# Patient Record
Sex: FEMALE | Race: WHITE | NOT HISPANIC OR LATINO | Employment: FULL TIME | URBAN - NONMETROPOLITAN AREA
[De-identification: names, ages, dates, MRNs, and addresses within clinical notes are randomized per-mention and may not be internally consistent; named-entity substitution may affect disease eponyms.]

---

## 2017-02-17 ENCOUNTER — HOSPITAL ENCOUNTER (OUTPATIENT)
Dept: GENERAL RADIOLOGY | Facility: HOSPITAL | Age: 37
Discharge: HOME | End: 2017-02-17
Admitting: FAMILY MEDICINE

## 2017-02-17 ENCOUNTER — TRANSCRIBE ORDERS (OUTPATIENT)
Dept: ADMINISTRATIVE | Facility: HOSPITAL | Age: 37
End: 2017-02-17

## 2017-02-17 DIAGNOSIS — M54.2 CERVICALGIA: ICD-10-CM

## 2017-02-17 DIAGNOSIS — M54.2 CERVICALGIA: Primary | ICD-10-CM

## 2017-02-17 PROCEDURE — 72050 X-RAY EXAM NECK SPINE 4/5VWS: CPT

## 2017-02-27 ENCOUNTER — APPOINTMENT (OUTPATIENT)
Dept: NUCLEAR MEDICINE | Facility: HOSPITAL | Age: 37
End: 2017-02-27

## 2017-02-27 ENCOUNTER — HOSPITAL ENCOUNTER (OUTPATIENT)
Dept: NUCLEAR MEDICINE | Facility: HOSPITAL | Age: 37
End: 2017-02-27

## 2017-02-28 ENCOUNTER — APPOINTMENT (OUTPATIENT)
Dept: NUCLEAR MEDICINE | Facility: HOSPITAL | Age: 37
End: 2017-02-28

## 2017-03-15 ENCOUNTER — TRANSCRIBE ORDERS (OUTPATIENT)
Dept: ADMINISTRATIVE | Facility: HOSPITAL | Age: 37
End: 2017-03-15

## 2017-03-15 DIAGNOSIS — E05.90 HYPERTHYROIDISM: Primary | ICD-10-CM

## 2017-03-30 ENCOUNTER — HOSPITAL ENCOUNTER (OUTPATIENT)
Dept: NUCLEAR MEDICINE | Facility: HOSPITAL | Age: 37
Discharge: HOME | End: 2017-03-30

## 2017-03-30 DIAGNOSIS — E05.90 HYPERTHYROIDISM: ICD-10-CM

## 2017-03-30 PROCEDURE — A9516 IODINE I-123 SOD IODIDE MIC: HCPCS | Performed by: FAMILY MEDICINE

## 2017-03-30 PROCEDURE — 0 SODIUM IODIDE 3.7 MBQ CAPSULE: Performed by: FAMILY MEDICINE

## 2017-03-30 RX ORDER — SODIUM IODIDE I 123 100 UCI/1
1 CAPSULE, GELATIN COATED ORAL
Status: COMPLETED | OUTPATIENT
Start: 2017-03-30 | End: 2017-03-30

## 2017-03-30 RX ADMIN — Medication 1 CAPSULE: at 09:15

## 2017-03-31 ENCOUNTER — HOSPITAL ENCOUNTER (OUTPATIENT)
Dept: NUCLEAR MEDICINE | Facility: HOSPITAL | Age: 37
Discharge: HOME | End: 2017-03-31

## 2017-08-18 ENCOUNTER — TRANSCRIBE ORDERS (OUTPATIENT)
Dept: PHYSICAL THERAPY | Facility: HOSPITAL | Age: 37
End: 2017-08-18

## 2017-08-18 DIAGNOSIS — M54.2 CERVICALGIA: Primary | ICD-10-CM

## 2017-08-25 ENCOUNTER — HOSPITAL ENCOUNTER (OUTPATIENT)
Dept: PHYSICAL THERAPY | Facility: HOSPITAL | Age: 37
Setting detail: THERAPIES SERIES
Discharge: HOME | End: 2017-08-25

## 2017-08-25 DIAGNOSIS — M54.2 NECK PAIN: Primary | ICD-10-CM

## 2017-08-25 PROCEDURE — 97161 PT EVAL LOW COMPLEX 20 MIN: CPT
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
## 2017-09-01 ENCOUNTER — HOSPITAL ENCOUNTER (OUTPATIENT)
Dept: PHYSICAL THERAPY | Facility: HOSPITAL | Age: 37
Setting detail: THERAPIES SERIES
Discharge: HOME | End: 2017-09-01

## 2017-09-01 DIAGNOSIS — M54.2 NECK PAIN: Primary | ICD-10-CM

## 2017-09-01 PROCEDURE — 97140 MANUAL THERAPY 1/> REGIONS: CPT | Performed by: PHYSICAL THERAPIST

## 2017-09-05 NOTE — THERAPY TREATMENT NOTE
Outpatient Physical Therapy Ortho Treatment Note   [place]     Patient Name: Aaliyah Obando  : 1980  MRN: [id number]  Today's Date: 2017      Visit Date: 2017    Visit Dx:    ICD-10-CM ICD-9-CM   1. Neck pain M54.2 723.1       Patient Active Problem List   Diagnosis   • Upper respiratory infection        Past Medical History:   Diagnosis Date   • Asthma    • Gallbladder abscess    • History of bronchitis         Past Surgical History:   Procedure Laterality Date   • CHOLECYSTECTOMY                                                                  Time Calculation:   Start Time: 45  Stop Time: 1030  Time Calculation (min): 45 min  Total Timed Code Minutes- PT: 45 minute(s)                  Jayden Chandler, PT  2017

## 2017-09-08 ENCOUNTER — HOSPITAL ENCOUNTER (OUTPATIENT)
Dept: PHYSICAL THERAPY | Facility: HOSPITAL | Age: 37
Setting detail: THERAPIES SERIES
Discharge: HOME | End: 2017-09-08

## 2017-09-08 DIAGNOSIS — M54.2 NECK PAIN: Primary | ICD-10-CM

## 2017-09-08 PROCEDURE — 97110 THERAPEUTIC EXERCISES: CPT

## 2017-09-08 PROCEDURE — 97140 MANUAL THERAPY 1/> REGIONS: CPT

## 2017-09-08 NOTE — THERAPY TREATMENT NOTE
Outpatient Physical Therapy Ortho Treatment Note  [facility]     Patient Name: Aaliyah Obando  : 1980  MRN: [id number]  Today's Date: 2017      Visit Date: 2017    Visit Dx:    ICD-10-CM ICD-9-CM   1. Neck pain M54.2 723.1       Patient Active Problem List   Diagnosis   • Upper respiratory infection        Past Medical History:   Diagnosis Date   • Asthma    • Gallbladder abscess    • History of bronchitis         Past Surgical History:   Procedure Laterality Date   • CHOLECYSTECTOMY                               PT Assessment/Plan       17 1127       PT Assessment    Assessment Comments Her thoracic spine and cervical spine continue to demonstrate mobility impairments thus placing continued strain across her lower cervical spine for mobility and leading to poor compensatory movement patterns. We focused on ST retrictions and joint mobility with carry over to AROM and muscle re-education of the cervical spine.   -TC     PT Plan    PT Plan Comments Continue to addresess her ST and mobility restrictions with progression towards postural ROM and stability exercises for re-education.   -TC       User Key  (r) = Recorded By, (t) = Taken By, (c) = Cosigned By    Initials Name Provider Type    TC Yusra Gibson, PT DPT Physical Therapist                    Exercises       17 0963          Subjective Comments    Subjective Comments Pt reported that her neck is a bit flared today because she has been travelling a lot. She still reports HAs. Her R arm has gone numb several times in the past week.    -TC      Subjective Pain    Able to rate subjective pain? yes  -TC      Pre-Treatment Pain Level 4  -TC      Subjective Pain Comment 4/10 HA and neck pain, and R arm feels weird   -TC      Exercise 1    Exercise Name 1 BARB thoracic ext ROM   -TC      Cueing 1 Verbal;Tactile;Demo  -TC      Sets 1 2  -TC      Reps 1 15  -TC      Exercise 2    Exercise Name 2 chin tucks   -TC      Cueing 2  Verbal;Tactile;Demo  -TC      Sets 2 3  -TC      Reps 2 10  -TC      Exercise 3    Exercise Name 3 chin tuck with rotation   -TC      Cueing 3 Verbal;Tactile;Demo  -TC      Sets 3 3  -TC      Reps 3 10  -TC      Exercise 4    Exercise Name 4 educated on thoracic towel roll stretch/pect stretch supine vs thoracic wall stretch (placed too much of a strain on her neck and R shoulder)    -TC      Time (Minutes) 4 2  -TC      Exercise 5    Exercise Name 5 reivewed and practiced osture in sitting and standing  -TC      Time (Minutes) 5 4  -TC        User Key  (r) = Recorded By, (t) = Taken By, (c) = Cosigned By    Initials Name Provider Type    TC Yusra Gibson, PT DPT Physical Therapist                        Manual Rx (last 36 hours)      Manual Treatments       09/08/17 0938          Manual Rx 1    Manual Rx 1 Location prone thoracic  -TC      Manual Rx 1 Type foam roll ext  -TC      Manual Rx 1 Grade min to mod OP  -TC      Manual Rx 1 Duration 10  -TC      Manual Rx 2    Manual Rx 2 Location prone CT  -TC      Manual Rx 2 Type ext mob  -TC      Manual Rx 2 Grade sustained 3  -TC      Manual Rx 2 Duration 7  -TC      Manual Rx 3    Manual Rx 3 Location STM sara UT/LS  -TC      Manual Rx 3 Duration 6  -TC      Manual Rx 4    Manual Rx 4 Location prone sara C1   -TC      Manual Rx 4 Type PA mob  -TC      Manual Rx 4 Grade 2 repetitive sara  -TC      Manual Rx 4 Duration 5  -TC        User Key  (r) = Recorded By, (t) = Taken By, (c) = Cosigned By    Initials Name Provider Type    TC Yusra Gibson, PT DPT Physical Therapist                PT OP Goals       09/08/17 0938       PT Short Term Goals    STG 1 Patient will be able to demonstrate offloading technique for relief of (R) sided neck and shoulder pain.  -TC     STG 1 Progress New  -TC     Long Term Goals    LTG 1 Patient will demonstrate full and painfree (R) UE elevation without compensation by discharge.  -TC     LTG 1 Progress New  -TC     LTG 2 Patient  will maintain neutral neck posture for >10 min without verbal cueing or an increase in her symptoms by discharge.  -TC     LTG 2 Progress Ongoing  -TC     LTG 2 Progress Comments she still requires cues to maintain midline cervicothoracic orientation  -TC     LTG 3 Patient will demonstrate a 4/5 MMT on bilateral mid and low trapezius muscles by discharge.  -TC     LTG 3 Progress New  -TC     LTG 4 Patient will be able to lift a weighted box from a counter height and transfer it to another counter with proper body mechanics and neck symptoms <3/10 on the VAS scale by discharge.  -TC     LTG 4 Progress New  -TC     LTG 5 Patient will demonstrate normal upper and mid thoracic accessory motion mobility into extension by discharge.  -TC     LTG 5 Progress New  -TC     LTG 6 Patient will be independent with a HEP by discharge.  -TC     LTG 6 Progress Ongoing  -TC     LTG 6 Progress Comments Pt reports that she is performing her HEP daily   -TC     Time Calculation    PT Goal Re-Cert Due Date 09/24/17  -TC       User Key  (r) = Recorded By, (t) = Taken By, (c) = Cosigned By    Initials Name Provider Type    STEPHEN Gibson, PT DPT Physical Therapist                Therapy Education       09/08/17 1126          Therapy Education    Education Details took out standing prayer stretch because she was placing increased strain across cervical spine and R shoulder; given towel roll pect stretch hooklying instead for now, cervical retraction with slight rotation in hooklying   -TC      Given HEP;Symptoms/condition management;Posture/body mechanics  -TC      Program New  -TC      How Provided Verbal;Demonstration  -TC      Provided to Patient  -TC      Level of Understanding Teach back education performed;Verbalized;Demonstrated  -TC        User Key  (r) = Recorded By, (t) = Taken By, (c) = Cosigned By    Initials Name Provider Type    STEPHEN Gibson, PT DPT Physical Therapist                Time Calculation:   Start  Time: 0938  Stop Time: 1025  Time Calculation (min): 47 min  Total Timed Code Minutes- PT: 47 minute(s)    Therapy Charges for Today     Code Description Service Date Service Provider Modifiers Qty    08909287829 HC PT MANUAL THERAPY EA 15 MIN 9/8/2017 Yusra Gibson, PT DPT GP 2    44561867671 HC PT THER PROC EA 15 MIN 9/8/2017 Yusra Gibson, PT DPT GP 1                    Yusra Gibson, PT DPT  9/8/2017

## 2017-09-11 ENCOUNTER — HOSPITAL ENCOUNTER (OUTPATIENT)
Dept: PHYSICAL THERAPY | Facility: HOSPITAL | Age: 37
Setting detail: THERAPIES SERIES
Discharge: HOME | End: 2017-09-11

## 2017-09-11 DIAGNOSIS — M54.2 NECK PAIN: Primary | ICD-10-CM

## 2017-09-11 PROCEDURE — 97140 MANUAL THERAPY 1/> REGIONS: CPT

## 2017-09-11 PROCEDURE — 97110 THERAPEUTIC EXERCISES: CPT

## 2017-09-11 NOTE — THERAPY TREATMENT NOTE
Outpatient Physical Therapy Ortho Treatment Note  [facility]     Patient Name: Aaliyah Obando  : 1980  MRN: [id number]  Today's Date: 2017      Visit Date: 2017    Visit Dx:    ICD-10-CM ICD-9-CM   1. Neck pain M54.2 723.1       Patient Active Problem List   Diagnosis   • Upper respiratory infection        Past Medical History:   Diagnosis Date   • Asthma    • Gallbladder abscess    • History of bronchitis         Past Surgical History:   Procedure Laterality Date   • CHOLECYSTECTOMY                               PT Assessment/Plan       17 1000       PT Assessment    Assessment Comments The patient was having more right UE radicular pain today that was abolished with treatment today.  She had been lifting over the weekend which may have flared her symptoms.  No first rib elevation was noted on the right with assessment today.  Retraction and neutral spine abolished the right UE symptoms.  -RS     PT Plan    PT Plan Comments reassess the effects of the HEP addition; continue to address postural strength/endurance and UE mechanics.  -RS       User Key  (r) = Recorded By, (t) = Taken By, (c) = Cosigned By    Initials Name Provider Type    RS Luis Antonio Shultz, PT DPT Physical Therapist                    Exercises       17 0900          Subjective Comments    Subjective Comments Patient did quite a bit of lifting over the weekend and did drive from [facility] this morning.  The pain is going all the way down the right arm today.    -RS      Subjective Pain    Able to rate subjective pain? yes  -RS      Pre-Treatment Pain Level 6  -RS      Subjective Pain Comment right UE radicular pain abolished after treatment  -RS      Exercise 1    Exercise Name 1 supine retraction sustained hold  -RS      Cueing 1 Demo  -RS      Sets 1 3  -RS      Reps 1 5  -RS      Time (Seconds) 1 15  -RS      Additional Comments abolished right UE radicular pain  -RS      Exercise 2    Exercise Name  2 supine white half foam roller extension/pec stretch  -RS      Cueing 2 Verbal  -RS      Time (Minutes) 2 5   -RS      Additional Comments fingertips interlocked and rested on forehead for pec stretch.  -RS      Exercise 3    Exercise Name 3 reviewed HEP with emphasis on neutral posture and retaction for UE symptoms.  -RS        User Key  (r) = Recorded By, (t) = Taken By, (c) = Cosigned By    Initials Name Provider Type    RS Luis Antonio Shultz, PT DPT Physical Therapist                        Manual Rx (last 36 hours)      Manual Treatments       09/11/17 0900          Manual Rx 1    Manual Rx 1 Location prone thoracic  -RS      Manual Rx 1 Type oscillatory  -RS      Manual Rx 1 Grade min OP  -RS      Manual Rx 1 Duration 10  -RS      Manual Rx 2    Manual Rx 2 Location prone CT  -RS      Manual Rx 2 Type ext mob  -RS      Manual Rx 2 Grade sustained 3  -RS      Manual Rx 2 Duration 7  -RS      Manual Rx 3    Manual Rx 3 Location STM right UT/LS  -RS      Manual Rx 3 Duration 6  -RS      Manual Rx 4    Manual Rx 4 Location supine cervical manual traction  -RS      Manual Rx 4 Type sustained  -RS      Manual Rx 4 Grade 2  -RS      Manual Rx 4 Duration 5 x 30 sec  -RS        User Key  (r) = Recorded By, (t) = Taken By, (c) = Cosigned By    Initials Name Provider Type    RS Luis Antonio Shultz, PT DPT Physical Therapist                PT OP Goals       09/11/17 1000       PT Short Term Goals    STG 1 Patient will be able to demonstrate offloading technique for relief of (R) sided neck and shoulder pain.  -RS     STG 1 Progress Ongoing  -RS     STG 1 Progress Comments instructed on unweighing with belt loop with standing; discussed driving mechanics  -RS     Long Term Goals    LTG 1 Patient will demonstrate full and painfree (R) UE elevation without compensation by discharge.  -RS     LTG 1 Progress Ongoing  -RS     LTG 2 Patient will maintain neutral neck posture for >10 min without verbal cueing or an increase  in her symptoms by discharge.  -RS     LTG 2 Progress Ongoing  -RS     LTG 3 Patient will demonstrate a 4/5 MMT on bilateral mid and low trapezius muscles by discharge.  -RS     LTG 3 Progress Ongoing  -RS     LTG 4 Patient will be able to lift a weighted box from a counter height and transfer it to another counter with proper body mechanics and neck symptoms <3/10 on the VAS scale by discharge.  -RS     LTG 4 Progress Ongoing  -RS     LTG 5 Patient will demonstrate normal upper and mid thoracic accessory motion mobility into extension by discharge.  -RS     LTG 5 Progress Ongoing  -RS     LTG 6 Patient will be independent with a HEP by discharge.  -RS     LTG 6 Progress Progressing  -RS     LTG 6 Progress Comments progressed today; compliant  -RS     Time Calculation    PT Goal Re-Cert Due Date 09/24/17  -RS       User Key  (r) = Recorded By, (t) = Taken By, (c) = Cosigned By    Initials Name Provider Type    RS Luis Antonio Shultz, PT DPT Physical Therapist                Therapy Education       09/11/17 1000          Therapy Education    Education Details supine retraction; attempt supine towel roll pec/thoracic stretch; driving mechanics   -RS      Given HEP;Symptoms/condition management  -RS      Program New  -RS      How Provided Verbal;Demonstration  -RS      Provided to Patient  -RS      Level of Understanding Demonstrated;Verbalized  -RS        User Key  (r) = Recorded By, (t) = Taken By, (c) = Cosigned By    Initials Name Provider Type    KENNY Shultz, PT DPT Physical Therapist                Time Calculation:   Start Time: 0943  Stop Time: 1028  Time Calculation (min): 45 min  Total Timed Code Minutes- PT: 45 minute(s)    Therapy Charges for Today     Code Description Service Date Service Provider Modifiers Qty    34756017887 HC PT MANUAL THERAPY EA 15 MIN 9/11/2017 Luis Antonio Shultz, PT DPT GP 2    58735049722 HC PT THER PROC EA 15 MIN 9/11/2017 Luis Antonio Shultz, PT DPT GP 1                     ASHOK Shultz, PT DPT  9/11/2017

## 2017-09-15 ENCOUNTER — HOSPITAL ENCOUNTER (OUTPATIENT)
Dept: PHYSICAL THERAPY | Facility: HOSPITAL | Age: 37
Setting detail: THERAPIES SERIES
Discharge: HOME | End: 2017-09-15

## 2017-09-15 DIAGNOSIS — M54.2 NECK PAIN: Primary | ICD-10-CM

## 2017-09-15 PROCEDURE — 97140 MANUAL THERAPY 1/> REGIONS: CPT

## 2017-09-15 PROCEDURE — 97110 THERAPEUTIC EXERCISES: CPT

## 2017-09-15 NOTE — THERAPY TREATMENT NOTE
Outpatient Physical Therapy Ortho Treatment Note  [facility]     Patient Name: Aaliyah Obando  : 1980  MRN: [id number]  Today's Date: 9/15/2017      Visit Date: 09/15/2017    Visit Dx:    ICD-10-CM ICD-9-CM   1. Neck pain M54.2 723.1       Patient Active Problem List   Diagnosis   • Upper respiratory infection        Past Medical History:   Diagnosis Date   • Asthma    • Gallbladder abscess    • History of bronchitis         Past Surgical History:   Procedure Laterality Date   • CHOLECYSTECTOMY               PT Ortho       09/15/17 8158    Subjective Comments    Subjective Comments Pt stated that she went swimming yesterday and is very sore and aggravated today. Her neck pain is worse too.   -TC      User Key  (r) = Recorded By, (t) = Taken By, (c) = Cosigned By    Initials Name Provider Type    STEPHEN Gibson, PT DPT Physical Therapist                            PT Assessment/Plan       09/15/17 1864       PT Assessment    Assessment Comments Her pain was abolished post treatment in regards to her R UE radicular pain and HA/neck pain. Her thoracic hyperkyphosis is improving along with her postural alignment,although she still requires cues for erect posture. Her cervicothoracic guarding and mobility continue to be impairments exacerbating her symptoms.   -TC     PT Plan    PT Plan Comments Continue to address her cervicothoracic guarding and mobility and progress her postural stability and strength. Reassess radicular symptoms.   -TC       User Key  (r) = Recorded By, (t) = Taken By, (c) = Cosigned By    Initials Name Provider Type    STEPHEN Gibson, PT DPT Physical Therapist                    Exercises       09/15/17 5816          Subjective Comments    Subjective Comments Pt stated that she went swimming yesterday and is very sore and aggravated today. Her neck pain is worse too.   -TC      Subjective Pain    Able to rate subjective pain? yes  -TC      Pre-Treatment Pain Level 5  -TC       Subjective Pain Comment R neck and RUE radicular pain to her R elbow; current HA  -TC      Exercise 1    Exercise Name 1 hooklying 1/2 foam pect minor stretch   -TC      Cueing 1 Verbal;Tactile;Demo  -TC      Sets 1 2  -TC      Time (Seconds) 1 15  -TC      Exercise 2    Exercise Name 2 hooklying 1/2 foam scapular Ws   -TC      Cueing 2 Verbal;Tactile;Demo  -TC      Sets 2 3  -TC      Reps 2 10  -TC      Exercise 3    Exercise Name 3 hooklying 1/2 foam roller, chin tucks   -TC      Cueing 3 Verbal;Tactile;Demo  -TC      Sets 3 3  -TC      Reps 3 10  -TC      Exercise 4    Exercise Name 4 Practiced mechanics of gait.   -TC      Cueing 4 Verbal;Tactile;Demo  -TC      Time (Minutes) 4 5  -TC        User Key  (r) = Recorded By, (t) = Taken By, (c) = Cosigned By    Initials Name Provider Type    TC Yusra Gibson, PT DPT Physical Therapist                        Manual Rx (last 36 hours)      Manual Treatments       09/15/17 0853          Manual Rx 1    Manual Rx 1 Location prone thoracic  -TC      Manual Rx 1 Type oscillatory  -TC      Manual Rx 1 Grade min OP  -TC      Manual Rx 1 Duration 10  -TC      Manual Rx 2    Manual Rx 2 Location prone CT  -TC      Manual Rx 2 Type ext mob  -TC      Manual Rx 2 Grade sustained 3  -TC      Manual Rx 2 Duration 7  -TC      Manual Rx 3    Manual Rx 3 Location STM right UT/LS  -TC      Manual Rx 3 Duration 6  -TC      Manual Rx 4    Manual Rx 4 Location supine cervical manual traction  -TC      Manual Rx 4 Type sustained  -TC      Manual Rx 4 Grade 2  -TC      Manual Rx 4 Duration 5  -TC      Manual Rx 5    Manual Rx 5 Location suboccipital release   -TC      Manual Rx 5 Duration 5  -TC        User Key  (r) = Recorded By, (t) = Taken By, (c) = Cosigned By    Initials Name Provider Type    TC Yusra Gibson, PT DPT Physical Therapist                PT OP Goals       09/15/17 0853       PT Short Term Goals    STG 1 Patient will be able to demonstrate offloading technique  for relief of (R) sided neck and shoulder pain.  -TC     STG 1 Progress Ongoing  -TC     Long Term Goals    LTG 1 Patient will demonstrate full and painfree (R) UE elevation without compensation by discharge.  -TC     LTG 1 Progress Ongoing  -TC     LTG 2 Patient will maintain neutral neck posture for >10 min without verbal cueing or an increase in her symptoms by discharge.  -TC     LTG 2 Progress Ongoing  -TC     LTG 3 Patient will demonstrate a 4/5 MMT on bilateral mid and low trapezius muscles by discharge.  -TC     LTG 3 Progress Ongoing  -TC     LTG 4 Patient will be able to lift a weighted box from a counter height and transfer it to another counter with proper body mechanics and neck symptoms <3/10 on the VAS scale by discharge.  -TC     LTG 4 Progress Ongoing  -TC     LTG 5 Patient will demonstrate normal upper and mid thoracic accessory motion mobility into extension by discharge.  -TC     LTG 5 Progress Ongoing  -TC     LTG 5 Progress Comments worked on this today and it is improving each session, improved thoracic hyperkyphosis   -TC     LTG 6 Patient will be independent with a HEP by discharge.  -TC     LTG 6 Progress Progressing  -TC     LTG 6 Progress Comments reports daily compliance  -TC     Time Calculation    PT Goal Re-Cert Due Date 09/24/17  -TC       User Key  (r) = Recorded By, (t) = Taken By, (c) = Cosigned By    Initials Name Provider Type    STEPHEN Gibson, PT DPT Physical Therapist                Therapy Education       09/15/17 0931          Therapy Education    Given HEP;Symptoms/condition management;Posture/body mechanics  -TC      Program New  -TC      How Provided Verbal;Demonstration  -TC      Provided to Patient  -TC      Level of Understanding Teach back education performed;Verbalized;Demonstrated  -TC        User Key  (r) = Recorded By, (t) = Taken By, (c) = Cosigned By    Initials Name Provider Type    STEPHEN Gibson, PT DPT Physical Therapist                Time  Calculation:   Start Time: 0853  Stop Time: 0936  Time Calculation (min): 43 min  Total Timed Code Minutes- PT: 43 minute(s)    Therapy Charges for Today     Code Description Service Date Service Provider Modifiers Qty    76285204700 HC PT MANUAL THERAPY EA 15 MIN 9/15/2017 Yusra Gibson, PT DPT GP 2    55267381769 HC PT THER PROC EA 15 MIN 9/15/2017 Yusra Gibson, PT DPT GP 1                    Yusra Gibson, PT DPT  9/15/2017

## 2017-09-18 ENCOUNTER — HOSPITAL ENCOUNTER (OUTPATIENT)
Dept: PHYSICAL THERAPY | Facility: HOSPITAL | Age: 37
Setting detail: THERAPIES SERIES
Discharge: HOME | End: 2017-09-18

## 2017-09-18 DIAGNOSIS — M54.2 NECK PAIN: Primary | ICD-10-CM

## 2017-09-18 PROCEDURE — 97110 THERAPEUTIC EXERCISES: CPT

## 2017-09-18 PROCEDURE — 97140 MANUAL THERAPY 1/> REGIONS: CPT

## 2017-09-18 NOTE — THERAPY TREATMENT NOTE
Outpatient Physical Therapy Ortho Treatment Note   [place]     Patient Name: Aaliyah Obando  : 1980  MRN: [id number]  Today's Date: 2017      Visit Date: 2017    Visit Dx:    ICD-10-CM ICD-9-CM   1. Neck pain M54.2 723.1       Patient Active Problem List   Diagnosis   • Upper respiratory infection        Past Medical History:   Diagnosis Date   • Asthma    • Gallbladder abscess    • History of bronchitis         Past Surgical History:   Procedure Laterality Date   • CHOLECYSTECTOMY                               PT Assessment/Plan       17 1000       PT Assessment    Assessment Comments Patient does demonstrate a strong CW thoracic (right rotation) that is placing more stress on the right CT junction.  She was able to self correct which provided great relief.  We will continue with a more active approach for postural correction.  -RS     PT Plan    PT Plan Comments assess the effects of the HEP; continue to emphasize thoracic neutral and right cervical unloading  -RS       User Key  (r) = Recorded By, (t) = Taken By, (c) = Cosigned By    Initials Name Provider Type    RS Luis Antonio Shultz, PT DPT Physical Therapist                    Exercises       17 0800          Subjective Comments    Subjective Comments Patient is doing better today.  She is not having any arm pain today, jjust in the neck and shoulder  -RS      Subjective Pain    Able to rate subjective pain? yes  -RS      Pre-Treatment Pain Level 3  -RS      Post-Treatment Pain Level 0  -RS      Exercise 1    Exercise Name 1 prone mid/low trap level 1 (fingertips on the back of the head)   pillow under abdomen  -RS      Cueing 1 Verbal  -RS      Sets 1 2  -RS      Reps 1 20  -RS      Additional Comments (L) side only  -RS      Exercise 2    Exercise Name 2 prone Y   pillow under abdomen  -RS      Cueing 2 Verbal  -RS      Sets 2 2  -RS      Reps 2 20  -RS      Additional Comments (L) side only  -RS      Exercise 3     Exercise Name 3 (L) resisted thoracic open book rotation  -RS      Cueing 3 Demo  -RS      Sets 3 4  -RS      Reps 3 20  -RS      Additional Comments red theraband resistance  -RS      Exercise 4    Exercise Name 4 thoracic extension against the wall with CCW active thoracic rotation to correct; added to HEP  -RS      Cueing 4 Demo  -RS      Time (Minutes) 4 5  -RS      Additional Comments multiple times per day  -RS        User Key  (r) = Recorded By, (t) = Taken By, (c) = Cosigned By    Initials Name Provider Type    RS Luis Antonio Shultz, PT DPT Physical Therapist                        Manual Rx (last 36 hours)      Manual Treatments       09/18/17 0900          Manual Rx 1    Manual Rx 1 Location prone thoracic  -RS      Manual Rx 1 Type oscillatory  -RS      Manual Rx 1 Grade min OP  -RS      Manual Rx 1 Duration 10  -RS      Manual Rx 2    Manual Rx 2 Location prone CT right side (T1-T3)  -RS      Manual Rx 2 Type ext mob  -RS      Manual Rx 2 Grade oscillatory 2/3  -RS      Manual Rx 2 Duration 5  -RS        User Key  (r) = Recorded By, (t) = Taken By, (c) = Cosigned By    Initials Name Provider Type    RS Luis Antonio Shultz, PT DPT Physical Therapist                PT OP Goals       09/18/17 0800       PT Short Term Goals    STG 1 Patient will be able to demonstrate offloading technique for relief of (R) sided neck and shoulder pain.  -RS     STG 1 Progress Partially Met  -RS     STG 1 Progress Comments able to self correct thoracic kyphosis and CW rotation  -RS     Long Term Goals    LTG 1 Patient will demonstrate full and painfree (R) UE elevation without compensation by discharge.  -RS     LTG 1 Progress Ongoing  -RS     LTG 1 Progress Comments limited scapular upward rotation due to kyphosis  -RS     LTG 2 Patient will maintain neutral neck posture for >10 min without verbal cueing or an increase in her symptoms by discharge.  -RS     LTG 2 Progress Ongoing  -RS     LTG 3 Patient will demonstrate  a 4/5 MMT on bilateral mid and low trapezius muscles by discharge.  -RS     LTG 3 Progress Ongoing  -RS     LTG 4 Patient will be able to lift a weighted box from a counter height and transfer it to another counter with proper body mechanics and neck symptoms <3/10 on the VAS scale by discharge.  -RS     LTG 4 Progress Ongoing  -RS     LTG 5 Patient will demonstrate normal upper and mid thoracic accessory motion mobility into extension by discharge.  -RS     LTG 5 Progress Ongoing  -RS     LTG 6 Patient will be independent with a HEP by discharge.  -RS     LTG 6 Progress Progressing  -RS     Time Calculation    PT Goal Re-Cert Due Date 09/24/17  -RS       User Key  (r) = Recorded By, (t) = Taken By, (c) = Cosigned By    Initials Name Provider Type    KENNY Shultz, PT DPT Physical Therapist                Therapy Education       09/18/17 1000          Therapy Education    Education Details CCW thoracic rotation with extension against the wall  -RS      Given HEP  -RS      Program New  -RS      How Provided Demonstration  -RS      Provided to Patient  -RS      Level of Understanding Demonstrated  -RS        User Key  (r) = Recorded By, (t) = Taken By, (c) = Cosigned By    Initials Name Provider Type    KENNY Shultz, PT DPT Physical Therapist                Time Calculation:   Start Time: 0846  Stop Time: 0935  Time Calculation (min): 49 min  Total Timed Code Minutes- PT: 45 minute(s)    Therapy Charges for Today     Code Description Service Date Service Provider Modifiers Qty    78895973731  PT THER PROC EA 15 MIN 9/18/2017 Luis Antonio Shultz, PT DPT GP 2    [id number] HC PT MANUAL THERAPY EA 15 MIN 9/18/2017 Luis Antonio Shultz, PT DPT GP 1                    ASHOK Shultz, PT DPT  9/18/2017

## 2017-09-22 ENCOUNTER — APPOINTMENT (OUTPATIENT)
Dept: PHYSICAL THERAPY | Facility: HOSPITAL | Age: 37
End: 2017-09-22

## 2017-09-25 ENCOUNTER — HOSPITAL ENCOUNTER (OUTPATIENT)
Dept: PHYSICAL THERAPY | Facility: HOSPITAL | Age: 37
Setting detail: THERAPIES SERIES
Discharge: HOME | End: 2017-09-25

## 2017-09-25 DIAGNOSIS — M54.2 NECK PAIN: Primary | ICD-10-CM

## 2017-09-25 PROCEDURE — 97110 THERAPEUTIC EXERCISES: CPT

## 2017-09-25 PROCEDURE — 97140 MANUAL THERAPY 1/> REGIONS: CPT

## 2017-09-25 NOTE — THERAPY PROGRESS REPORT/RE-CERT
Outpatient Physical Therapy Ortho Progress Note  [facility]     Patient Name: Aaliyah Obando  : 1980  MRN: [id number]  Today's Date: 2017      Visit Date: 2017    Visit Dx:    ICD-10-CM ICD-9-CM   1. Neck pain M54.2 723.1       Patient Active Problem List   Diagnosis   • Upper respiratory infection        Past Medical History:   Diagnosis Date   • Asthma    • Gallbladder abscess    • History of bronchitis         Past Surgical History:   Procedure Laterality Date   • CHOLECYSTECTOMY                               PT Assessment/Plan       17 0848       PT Assessment    Functional Limitations Limitations in functional capacity and performance;Performance in work activities;Limitation in home management  -TB     Impairments Pain;Impaired muscle endurance;Impaired muscle length;Joint mobility;Poor body mechanics;Posture;Range of motion  -TB     Assessment Comments Patient has most of her pain during and after working.  She is working  on her posture as well as trying to use good body mechanics at work.  She still needs cues for her posture today as well as proper body mechnics.  She is not wearing a good supportive bra today, but she is at work.  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  I am hoping her pain levels decrease when she is able to work her normal schedule.   -AL     Rehab Potential Good  -TB     Patient/caregiver participated in establishment of treatment plan and goals Yes  -TB     Patient would benefit from skilled therapy intervention Yes  -TB     PT Plan    PT Frequency 2x/week  -TB     Predicted Duration of Therapy Intervention (days/wks) 4 weeks  -TB     Planned CPT's? PT THER PROC EA 15 MIN: 60356;PT THER ACT EA 15 MIN: 13619;PT MANUAL THERAPY EA 15 MIN: 25174;PT NEUROMUSC RE-EDUCATION EA 15 MIN: 66637;PT ELECTRICAL STIM UNATTEND: ;PT ELECTRICAL STIM ATTD EA 15 MIN: 16995;PT ULTRASOUND EA 15 MIN: 55843;PT TRACTION CERVICAL:  94083  -     PT Plan Comments Will continue to progress with her HEP as well as training for good body mechanics.   -AL       User Key  (r) = Recorded By, (t) = Taken By, (c) = Cosigned By    Initials Name Provider Type    TB Jayden Chandler, PT Physical Therapist    ANN MARIE Ibarra PTA Physical Therapy Assistant                    Exercises       09/25/17 0848          Subjective Comments    Subjective Comments She is hurting from her neck to the mid thoracic area.   She worked 17 hour days over the weekend and is really hurting.    -AL      Subjective Pain    Able to rate subjective pain? yes  -AL      Pre-Treatment Pain Level 5  -AL      Post-Treatment Pain Level 4  -AL      Exercise 1    Exercise Name 1 prone mid/low trap level 1 (fingertips on the back of the head)   pillow under abdomen  -AL      Cueing 1 Verbal  -AL      Sets 1 1  -AL      Reps 1 15  -AL      Exercise 2    Exercise Name 2 UE hasic With half foam behind back against the wal   R UE in pain free ranges  -AL      Cueing 2 Verbal  -AL      Sets 2 1  -AL      Reps 2 15  -AL      Exercise 3    Exercise Name 3 Standing with half foam hind back scapular retraction   -AL      Sets 3 1  -AL      Reps 3 15  -AL      Exercise 4    Exercise Name 4 She is to work on her posture while driiving.  -AL      Exercise 5    Exercise Name 5 Instructed on golfers bend for her to use at work.  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  -AL        User Key  (r) = Recorded By, (t) = Taken By, (c) = Cosigned By    Initials Name Provider Type    ANN MARIE Ibarra PTA Physical Therapy Assistant                        Manual Rx (last 36 hours)      Manual Treatments       09/25/17 0848          Manual Rx 2    Manual Rx 2 Location prone CT  -AL      Manual Rx 2 Type ext mob  -AL      Manual Rx 2 Grade sustained 3  -AL      Manual Rx 2 Duration 5  -AL      Manual Rx 3    Manual Rx 3 Location STM B Upper  and mid traps  -AL      Manual Rx 3 Duration 10  -AL        User Key  (r) = Recorded By, (t) = Taken By, (c) = Cosigned By    Initials Name Provider Type    ANN MARIE Ibarra, PTA Physical Therapy Assistant                PT OP Goals       09/25/17 0848       PT Short Term Goals    STG 1 Patient will be able to demonstrate offloading technique for relief of (R) sided neck and shoulder pain.  -AL     STG 1 Progress Partially Met  -AL     STG 1 Progress Comments Working on this while driving  -AL     Long Term Goals    LTG 1 Patient will demonstrate full and painfree (R) UE elevation without compensation by discharge.  -AL     LTG 1 Progress Ongoing  -AL     LTG 1 Progress Comments Still pain with R UE elevation  -AL     LTG 2 Patient will maintain neutral neck posture for >10 min without verbal cueing or an increase in her symptoms by discharge.  -AL     LTG 2 Progress Ongoing  -AL     LTG 2 Progress Comments Needs cues throughout treatment today  -AL     LTG 3 Patient will demonstrate a 4/5 MMT on bilateral mid and low trapezius muscles by discharge.  -AL     LTG 3 Progress Ongoing  -AL     LTG 3 Progress Comments Still exhibits weakness  -AL     LTG 4 Patient will be able to lift a weighted box from a counter height and transfer it to another counter with proper body mechanics and neck symptoms <3/10 on the VAS scale by discharge.  -AL     LTG 4 Progress Ongoing  -AL     LTG 4 Progress Comments Causes increase pain when working  -AL     LTG 5 Patient will demonstrate normal upper and mid thoracic accessory motion mobility into extension by discharge.  -AL     LTG 5 Progress Ongoing  -AL     LTG 5 Progress Comments Improving  -AL     LTG 6 Patient will be independent with a HEP by discharge.  -AL     LTG 6 Progress Progressing  -AL     LTG 6 Progress Comments She is working on her HEP daily  -AL     Time Calculation    PT Goal Re-Cert Due Date 10/25/17  -AL       User Key  (r) = Recorded By, (t) = Taken By, (c) =  Cosigned By    Initials Name Provider Type    ANN MARIE Ibarra PTA Physical Therapy Assistant                Therapy Education       09/25/17 0848          Therapy Education    Education Details Posture and body mechanics  -AL      Given HEP  -AL      Program Reinforced  -AL      How Provided Verbal  -AL      Provided to Patient  -AL      Level of Understanding Verbalized;Demonstrated;Teach back education performed  -AL        User Key  (r) = Recorded By, (t) = Taken By, (c) = Cosigned By    Initials Name Provider Type    ANN MARIE Ibarra PTA Physical Therapy Assistant                Time Calculation:   Start Time: 0848  Stop Time: 0937  Time Calculation (min): 49 min  Total Timed Code Minutes- PT: 49 minute(s)          The plan of care and all goals were reviewed and updated as needed by Jayden Chandler, PT 9/25/2017 5:46 PM          Jayden Chandler, PT  9/25/2017

## 2017-12-04 ENCOUNTER — DOCUMENTATION (OUTPATIENT)
Dept: PHYSICAL THERAPY | Facility: HOSPITAL | Age: 37
End: 2017-12-04

## 2017-12-04 DIAGNOSIS — M54.2 NECK PAIN: Primary | ICD-10-CM

## 2018-04-03 ENCOUNTER — PROCEDURE VISIT (OUTPATIENT)
Dept: OBSTETRICS AND GYNECOLOGY | Facility: CLINIC | Age: 38
End: 2018-04-03

## 2018-04-03 ENCOUNTER — OFFICE VISIT (OUTPATIENT)
Dept: OBSTETRICS AND GYNECOLOGY | Facility: CLINIC | Age: 38
End: 2018-04-03

## 2018-04-03 VITALS
BODY MASS INDEX: 24.29 KG/M2 | WEIGHT: 164 LBS | SYSTOLIC BLOOD PRESSURE: 120 MMHG | HEIGHT: 69 IN | DIASTOLIC BLOOD PRESSURE: 76 MMHG

## 2018-04-03 DIAGNOSIS — N88.9 ABNORMAL APPEARANCE OF CERVIX: ICD-10-CM

## 2018-04-03 DIAGNOSIS — N90.89 VULVAR LESION: ICD-10-CM

## 2018-04-03 DIAGNOSIS — N92.6 IRREGULAR BLEEDING: Primary | ICD-10-CM

## 2018-04-03 DIAGNOSIS — N92.1 MENORRHAGIA WITH IRREGULAR CYCLE: Primary | ICD-10-CM

## 2018-04-03 DIAGNOSIS — N92.1 PROLONGED MENSTRUATION: ICD-10-CM

## 2018-04-03 DIAGNOSIS — N93.9 VAGINAL BLEEDING: ICD-10-CM

## 2018-04-03 LAB
B-HCG UR QL: NEGATIVE
BASOPHILS # BLD AUTO: 0.05 10*3/MM3 (ref 0–0.2)
BASOPHILS NFR BLD AUTO: 0.6 % (ref 0–2)
EOSINOPHIL # BLD AUTO: 0.34 10*3/MM3 (ref 0–0.7)
EOSINOPHIL NFR BLD AUTO: 4 % (ref 0–4)
ERYTHROCYTE [DISTWIDTH] IN BLOOD BY AUTOMATED COUNT: 12.6 % (ref 12–15)
HCT VFR BLD AUTO: 39.7 % (ref 37–47)
HGB BLD-MCNC: 12.8 G/DL (ref 12–16)
IMM GRANULOCYTES # BLD: 0.03 10*3/MM3 (ref 0–0.03)
IMM GRANULOCYTES NFR BLD: 0.4 % (ref 0–5)
INTERNAL NEGATIVE CONTROL: NORMAL
INTERNAL POSITIVE CONTROL: NORMAL
LYMPHOCYTES # BLD AUTO: 2.5 10*3/MM3 (ref 0.72–4.86)
LYMPHOCYTES NFR BLD AUTO: 29.3 % (ref 15–45)
Lab: NORMAL
MCH RBC QN AUTO: 29.7 PG (ref 28–32)
MCHC RBC AUTO-ENTMCNC: 32.2 G/DL (ref 33–36)
MCV RBC AUTO: 92.1 FL (ref 82–98)
MONOCYTES # BLD AUTO: 0.69 10*3/MM3 (ref 0.19–1.3)
MONOCYTES NFR BLD AUTO: 8.1 % (ref 4–12)
NEUTROPHILS # BLD AUTO: 4.91 10*3/MM3 (ref 1.87–8.4)
NEUTROPHILS NFR BLD AUTO: 57.6 % (ref 39–78)
NRBC BLD AUTO-RTO: 0 /100 WBC (ref 0–0)
PLATELET # BLD AUTO: 264 10*3/MM3 (ref 130–400)
RBC # BLD AUTO: 4.31 10*6/MM3 (ref 4.2–5.4)
WBC # BLD AUTO: 8.52 10*3/MM3 (ref 4.8–10.8)

## 2018-04-03 PROCEDURE — 87661 TRICHOMONAS VAGINALIS AMPLIF: CPT | Performed by: NURSE PRACTITIONER

## 2018-04-03 PROCEDURE — 99214 OFFICE O/P EST MOD 30 MIN: CPT | Performed by: NURSE PRACTITIONER

## 2018-04-03 PROCEDURE — 87591 N.GONORRHOEAE DNA AMP PROB: CPT | Performed by: NURSE PRACTITIONER

## 2018-04-03 PROCEDURE — 81025 URINE PREGNANCY TEST: CPT | Performed by: NURSE PRACTITIONER

## 2018-04-03 PROCEDURE — G0123 SCREEN CERV/VAG THIN LAYER: HCPCS | Performed by: NURSE PRACTITIONER

## 2018-04-03 PROCEDURE — 87491 CHLMYD TRACH DNA AMP PROBE: CPT | Performed by: NURSE PRACTITIONER

## 2018-04-03 PROCEDURE — 76830 TRANSVAGINAL US NON-OB: CPT | Performed by: OBSTETRICS & GYNECOLOGY

## 2018-04-03 RX ORDER — MEDROXYPROGESTERONE ACETATE 10 MG/1
10 TABLET ORAL DAILY
Qty: 10 TABLET | Refills: 1 | Status: SHIPPED | OUTPATIENT
Start: 2018-04-03 | End: 2018-04-13

## 2018-04-03 NOTE — PROGRESS NOTES
Aaliyah Obando is a 37 y.o.      Chief Complaint   Patient presents with   • Menstrual Problem     New patient today with c/o irregular period just this month. Patient states that her periods usually only last 2-3 days. Her most recent period has lasted 13 days and is still bleeding. Patient is not on any form of birth control. Patient had US today.  [facility] [place], patient staes it was normal.            HPI -Patient has been on her menses x 13 days, it has gone from light to heavy back to what now is like a normal menstrual flow.  She usually has regular 5 day periods.  She does not use birth control, states her boyfriend is 36 and has not fathered any children.  Her PG test is negative.      The following portions of the patient's history were reviewed and updated as appropriate:vital signs, allergies, current medications, past family history, past medical history, past social history, past surgical history and problem list.    No current outpatient prescriptions on file.    Review of Systems   Constitutional: Negative for activity change, chills and fatigue.   HENT: Negative for congestion, drooling, facial swelling, nosebleeds, sinus pain and sore throat.    Eyes: Negative for photophobia, pain and discharge.   Respiratory: Negative for apnea, choking and stridor.    Cardiovascular: Negative for chest pain and leg swelling.   Gastrointestinal: Negative for abdominal distention, anal bleeding and diarrhea.   Endocrine: Negative for cold intolerance and polydipsia.        Dr. Josiah Chilel is monitoring her thyroid levels   Genitourinary: Positive for menstrual problem and vaginal bleeding. Negative for decreased urine volume, difficulty urinating, enuresis and genital sores.   Musculoskeletal: Positive for arthralgias and back pain.   Skin: Negative for color change and pallor.   Neurological: Negative for dizziness, seizures and light-headedness.  "  Psychiatric/Behavioral: Negative for agitation, confusion and hallucinations.     Breast ROS: negative    Objective      /76   Ht 175.3 cm (69\")   Wt 74.4 kg (164 lb)   LMP 03/22/2018 (Exact Date)   BMI 24.22 kg/m²       Physical Exam   Constitutional: She is oriented to person, place, and time. She appears well-developed and well-nourished.   HENT:   Head: Normocephalic and atraumatic.   Pulmonary/Chest: Effort normal.   Abdominal: Soft.   Genitourinary: Rectal exam shows no mass.       There is tenderness and lesion on the right labia. There is no rash on the right labia. There is no rash, lesion or injury on the left labia. Uterus is not deviated and not enlarged. Right adnexum displays no mass and no tenderness. Left adnexum displays no mass and no tenderness. No erythema, tenderness or bleeding in the vagina. No foreign body in the vagina. No signs of injury around the vagina. No vaginal discharge found.       Genitourinary Comments: There is a vurrucus type lesion on the inner right labia majora that has been present for years    It is tender and bleeds at times.    It is slightly uniformly pigmented and has the appearance of condylomata vs vurrucus keratosis      The uterus is prolapsed 1 degree    The cervix is large, the os is open with a yellowish smooth nodule seen that is about 1 cm in circumference   Neurological: She is alert and oriented to person, place, and time.   Skin: Skin is warm and dry.   Psychiatric: She has a normal mood and affect. Her behavior is normal.   Nursing note and vitals reviewed.       Assessment/Plan     ASSESSMENT/PLAN      Aaliyah was seen today for menstrual problem.    Diagnoses and all orders for this visit:    Menorrhagia with irregular cycle  -     POC Pregnancy, Urine  negative  -     medroxyPROGESTERone (PROVERA) 10 MG tablet; Take 1 tablet by mouth Daily for 10 days.    Prolonged menstruation    Abnormal appearance of cervix  Colposcopy scheduled  -     " Liquid-based Pap Smear, Screening    Vaginal bleeding  -     CBC & Differential    Vulvar lesion  Appears to be likely condylomata but since is tender and bleeds, may need to be biopsied before treatment initiated.  Aaliyah will RTO and see Dr. Ross.      Patient is to take the Provera 10 mgm x 10 days to stop the bleeding.  She knows this will then cause a withdrawal bleed.              Yvette Mueller, APRN  4/3/2018

## 2018-04-06 LAB
GEN CATEG CVX/VAG CYTO-IMP: ABNORMAL
LAB AP CASE REPORT: ABNORMAL
LAB AP GYN ADDITIONAL INFORMATION: ABNORMAL
Lab: ABNORMAL
PATH INTERP SPEC-IMP: ABNORMAL
STAT OF ADQ CVX/VAG CYTO-IMP: ABNORMAL

## 2018-04-10 ENCOUNTER — TELEPHONE (OUTPATIENT)
Dept: OBSTETRICS AND GYNECOLOGY | Facility: CLINIC | Age: 38
End: 2018-04-10

## 2018-04-24 ENCOUNTER — OFFICE VISIT (OUTPATIENT)
Dept: OBSTETRICS AND GYNECOLOGY | Facility: CLINIC | Age: 38
End: 2018-04-24

## 2018-04-24 VITALS
SYSTOLIC BLOOD PRESSURE: 100 MMHG | HEIGHT: 69 IN | WEIGHT: 168 LBS | BODY MASS INDEX: 24.88 KG/M2 | DIASTOLIC BLOOD PRESSURE: 74 MMHG

## 2018-04-24 DIAGNOSIS — R87.613 HGSIL (HIGH GRADE SQUAMOUS INTRAEPITHELIAL LESION) ON PAP SMEAR OF CERVIX: Primary | ICD-10-CM

## 2018-04-24 DIAGNOSIS — A63.0 CONDYLOMA ACUMINATUM OF VULVA: ICD-10-CM

## 2018-04-24 LAB
B-HCG UR QL: NEGATIVE
INTERNAL NEGATIVE CONTROL: NEGATIVE
INTERNAL POSITIVE CONTROL: NEGATIVE
Lab: NORMAL

## 2018-04-24 PROCEDURE — 88305 TISSUE EXAM BY PATHOLOGIST: CPT | Performed by: OBSTETRICS & GYNECOLOGY

## 2018-04-24 PROCEDURE — 57454 BX/CURETT OF CERVIX W/SCOPE: CPT | Performed by: OBSTETRICS & GYNECOLOGY

## 2018-04-24 NOTE — PROGRESS NOTES
"Aaliyah Obando is a 37 y.o. female  here today for colposcopy.  Her most recent Pap smear was read as HGSIL.  She has history of colposcopy for cervical dysplasia but no treatment.    /74 (BP Location: Right arm, Patient Position: Sitting, Cuff Size: Adult)   Ht 175.3 cm (69\")   Wt 76.2 kg (168 lb)   LMP 03/27/2018 (Approximate)   Breastfeeding? No   BMI 24.81 kg/m²      A urine pregnancy test in the office today was negative.    There is a 2cm condyloma on the right perineum.    Colposcopy was performed in the office today.  She was placed in the lithotomy position on the exam table.  A speculum was inserted and the cervix well visualized.  The cervix was cleansed with acetic acid swabs.  Inspection with the colposcope showed the transformation zone on the ectocervix.  It was seen in its entirety.  There were acetowhite lesions noted at 6:00 with faint punctation.  There was some active metaplasia noted.  Colposcopy was satisfactory. The cervix was anesthetized with Hurricaine spray.  A 6:00 biopsy was performed.  The biopsy site was made hemostatic with a silver nitrate stick.  An endocervical curettage was performed.    Colposcopic impression: Moderate dysplasia, vulvar condyloma    We will notify her when the pathology report is available and she will return in 2 weeks to discuss further care.  We have discussed post colposcopy instructions.   "

## 2018-04-24 NOTE — PROGRESS NOTES
Attempted to obtain health maintenance information, patient unable to provide answers for the following items Tdap and Pneumococcal Vaccine.

## 2018-04-26 LAB
CYTO UR: NORMAL
CYTO UR: NORMAL
LAB AP CASE REPORT: NORMAL
LAB AP CASE REPORT: NORMAL
LAB AP CLINICAL INFORMATION: NORMAL
LAB AP CLINICAL INFORMATION: NORMAL
Lab: NORMAL
Lab: NORMAL
PATH REPORT.FINAL DX SPEC: NORMAL
PATH REPORT.FINAL DX SPEC: NORMAL
PATH REPORT.GROSS SPEC: NORMAL
PATH REPORT.GROSS SPEC: NORMAL

## 2018-05-10 ENCOUNTER — OFFICE VISIT (OUTPATIENT)
Dept: OBSTETRICS AND GYNECOLOGY | Facility: CLINIC | Age: 38
End: 2018-05-10

## 2018-05-10 VITALS
SYSTOLIC BLOOD PRESSURE: 98 MMHG | HEIGHT: 69 IN | BODY MASS INDEX: 24.88 KG/M2 | WEIGHT: 168 LBS | DIASTOLIC BLOOD PRESSURE: 60 MMHG

## 2018-05-10 DIAGNOSIS — A63.0 CONDYLOMA ACUMINATUM OF VULVA: ICD-10-CM

## 2018-05-10 DIAGNOSIS — N87.9 CERVICAL DYSPLASIA: Primary | ICD-10-CM

## 2018-05-10 PROCEDURE — 99213 OFFICE O/P EST LOW 20 MIN: CPT | Performed by: OBSTETRICS & GYNECOLOGY

## 2018-05-10 NOTE — PROGRESS NOTES
"Aaliyah Obando is a 37 y.o. female here today for follow-up of cervical dysplasia.  She underwent colposcopy and biopsies returned showing mild dysplasia, and the ECC was positive.  Her previous Pap smear was read as HGSIL.  She also has a large right vulvar condyloma.  She denies abnormal vaginal bleeding or discharge.    Visit Vitals  BP 98/60 (BP Location: Right arm, Patient Position: Sitting)   Ht 175.3 cm (69\")   Wt 76.2 kg (168 lb)   LMP 04/26/2018 (Exact Date)   BMI 24.81 kg/m²     Pleasant female no acute distress  Breathing unlabored  Abdomen nontender    Assessment: Cervical dysplasia, vulvar condyloma    She would like to have the large vulvar condyloma removed, and I think given her Pap smear history performing a LEEP at the same time is reasonable.  Therefore we discussed the surgical procedure of LEEP cervical conization with excision of vulvar condyloma.  We discussed the risk of bleeding and infection and the importance of follow-up.  She is scheduled for surgery May 30.  Preoperative orders have been placed.    "

## 2018-05-14 RX ORDER — SODIUM CHLORIDE 9 MG/ML
100 INJECTION, SOLUTION INTRAVENOUS CONTINUOUS
Status: CANCELLED | OUTPATIENT
Start: 2018-05-14

## 2018-05-14 NOTE — H&P
[facility]  Aaliyah Obando  : 1980  MRN: [id number]  [facility]: [id number]    History and Physical    Subjective   Aaliyah Obando is a 37 y.o. year old  who presents for surgery due to vulvar condyloma and cervical dysplasia.  Her Pap smear was read as HGSIL but her biopsies were CONNER-1.    Past Medical History:   Diagnosis Date   • Asthma    • Gallbladder abscess    • History of bronchitis    • HPV in female      Past Surgical History:   Procedure Laterality Date   • CHOLECYSTECTOMY       Smoking status: Current Every Day Smoker                                                   Packs/day: 0.15      Years: 0.00         Types: Cigarettes     Last attempt to quit: 11/15/2014  Smokeless tobacco: Never Used                        No current outpatient prescriptions on file.    No Known Allergies    Family History   Problem Relation Age of Onset   • Cancer Mother    • Heart disease Mother    • Diabetes Mother    • Stroke Mother    • Obesity Mother    • Cancer Father    • Heart disease Father    • Diabetes Father    • Stroke Father    • Lung disease Father    • Diabetes Sister    • Cancer Maternal Grandmother    • Heart disease Maternal Grandmother    • Diabetes Maternal Grandmother    • Lung disease Maternal Grandmother    • Stroke Maternal Grandmother    • Obesity Maternal Grandmother    • Ovarian cancer Maternal Grandmother    • Cancer Maternal Grandfather    • Heart disease Maternal Grandfather    • Diabetes Maternal Grandfather    • Lung disease Maternal Grandfather    • Stroke Maternal Grandfather    • Obesity Maternal Grandfather    • Cancer Paternal Grandmother    • Heart disease Paternal Grandmother    • Diabetes Paternal Grandmother    • Lung disease Paternal Grandmother    • Stroke Paternal Grandmother    • Obesity Paternal Grandmother    • Cancer Paternal Grandfather    • Heart disease Paternal Grandfather    • Diabetes Paternal Grandfather    • Lung disease Paternal Grandfather    • Stroke  "Paternal Grandfather    • Obesity Paternal Grandfather    • Colon cancer Paternal Grandfather    • Breast cancer Neg Hx    • Uterine cancer Neg Hx    • Melanoma Neg Hx      Review of Systems   Constitutional: Negative for unexpected weight change.   HENT: Negative for trouble swallowing.    Respiratory: Negative for shortness of breath.    Cardiovascular: Negative for chest pain.   Musculoskeletal: Negative for neck stiffness.   Neurological: Negative for seizures.   Hematological: Does not bruise/bleed easily.         Objective   BP 98/60 (BP Location: Right arm, Patient Position: Sitting)   Ht 175.3 cm (69\")   Wt 76.2 kg (168 lb)   LMP 04/26/2018 (Exact Date)   BMI 24.81 kg/m²     Physical Exam   Physical Exam   Constitutional: She is oriented to person, place, and time. She appears well-developed and well-nourished.   HENT:   Head: Normocephalic and atraumatic.   Eyes: No scleral icterus.   Neck: No tracheal deviation present.   Cardiovascular: Normal rate and regular rhythm.    Pulmonary/Chest: Effort normal and breath sounds normal.   Abdominal: Soft. Bowel sounds are normal. She exhibits no distension. There is no tenderness.   Genitourinary: Uterus normal. Rectal exam shows no external hemorrhoid. Pelvic exam was performed with patient supine. There is no lesion on the right labia. There is no lesion on the left labia. Uterus is not enlarged, not fixed and not tender. Cervix exhibits no motion tenderness. Right adnexum displays no mass. Left adnexum displays no mass. No bleeding in the vagina. No vaginal discharge found.   Lymphadenopathy:        Right: No inguinal adenopathy present.        Left: No inguinal adenopathy present.   Neurological: She is alert and oriented to person, place, and time.   Skin: Skin is warm and dry.   Vitals reviewed.      Labs  Lab Results   Component Value Date     04/03/2018    HGB 12.8 04/03/2018    HCT 39.7 04/03/2018    WBC 8.52 04/03/2018        Assessment & " Plan    Aaliyah was seen today for results.    Diagnoses and all orders for this visit:    Cervical dysplasia  -     Case Request    Condyloma acuminatum of vulva  -     Case Request    Risks, benefits and indications for LEEP conization were discussed with the patient at length.  Risks of bleeding and infection were reviewed.  Post operative restrictions were discussed and encouraged.  The importance of post operative pap smear surveillance as directed was explained.  Unknown effects on fertility were discussed.  If pregnancy were to occur following the LEEP, management with ultrasounds to determine cervical length and timely diagnosis of cervical incompetence was explained.    All of the patient's questions were answered to her satisfaction and she agreed to proceeding as indicated.     Bautista Ross MD  5/14/2018  7:28 AM

## 2018-05-16 ENCOUNTER — APPOINTMENT (OUTPATIENT)
Dept: PREADMISSION TESTING | Facility: HOSPITAL | Age: 38
End: 2018-05-16

## 2018-05-16 VITALS
OXYGEN SATURATION: 98 % | HEART RATE: 70 BPM | HEIGHT: 70 IN | WEIGHT: 162.92 LBS | BODY MASS INDEX: 23.32 KG/M2 | DIASTOLIC BLOOD PRESSURE: 64 MMHG | SYSTOLIC BLOOD PRESSURE: 105 MMHG

## 2018-05-16 LAB
ANION GAP SERPL CALCULATED.3IONS-SCNC: 7 MMOL/L (ref 4–13)
BUN BLD-MCNC: 12 MG/DL (ref 5–21)
BUN/CREAT SERPL: 15 (ref 7–25)
CALCIUM SPEC-SCNC: 9.2 MG/DL (ref 8.4–10.4)
CHLORIDE SERPL-SCNC: 107 MMOL/L (ref 98–110)
CO2 SERPL-SCNC: 26 MMOL/L (ref 24–31)
CREAT BLD-MCNC: 0.8 MG/DL (ref 0.5–1.4)
DEPRECATED RDW RBC AUTO: 40.7 FL (ref 40–54)
ERYTHROCYTE [DISTWIDTH] IN BLOOD BY AUTOMATED COUNT: 12.5 % (ref 12–15)
GFR SERPL CREATININE-BSD FRML MDRD: 80 ML/MIN/1.73
GLUCOSE BLD-MCNC: 85 MG/DL (ref 70–100)
HCT VFR BLD AUTO: 41.3 % (ref 37–47)
HGB BLD-MCNC: 13.6 G/DL (ref 12–16)
MCH RBC QN AUTO: 29.3 PG (ref 28–32)
MCHC RBC AUTO-ENTMCNC: 32.9 G/DL (ref 33–36)
MCV RBC AUTO: 89 FL (ref 82–98)
PLATELET # BLD AUTO: 259 10*3/MM3 (ref 130–400)
PMV BLD AUTO: 10.5 FL (ref 6–12)
POTASSIUM BLD-SCNC: 4.1 MMOL/L (ref 3.5–5.3)
RBC # BLD AUTO: 4.64 10*6/MM3 (ref 4.2–5.4)
SODIUM BLD-SCNC: 140 MMOL/L (ref 135–145)
WBC NRBC COR # BLD: 8.81 10*3/MM3 (ref 4.8–10.8)

## 2018-05-16 PROCEDURE — 80048 BASIC METABOLIC PNL TOTAL CA: CPT | Performed by: OBSTETRICS & GYNECOLOGY

## 2018-05-16 PROCEDURE — 85027 COMPLETE CBC AUTOMATED: CPT | Performed by: OBSTETRICS & GYNECOLOGY

## 2018-05-16 PROCEDURE — 36415 COLL VENOUS BLD VENIPUNCTURE: CPT | Performed by: OBSTETRICS & GYNECOLOGY

## 2018-05-16 NOTE — DISCHARGE INSTRUCTIONS
DAY OF SURGERY INSTRUCTIONS    Attending:   Bautista Ross MD  Cervical Conization- Excision Vulvar         DATE OF SURGERY: MAY 30 2018  ARRIVAL TIME: AS DIRECTED BY OFFICE    DAY OF SURGERY TAKE ONLY THESE MEDICATIONS UNLESS OTHERWISE INSTRUCTED BY YOUR PHYSICIAN: NONE              MANAGING PAIN AFTER SURGERY    We know you are probably wondering what your pain will be like after surgery.  Following surgery it is unrealistic to expect you will not have pain.   Pain is how our bodies let us know that something is wrong or cautions us to be careful.  That said, our goal is to make your pain tolerable.    Methods we may use to treat your pain include (oral or IV medications, PCAs, epidurals, nerve blocks, etc.)   While some procedures require IV pain medications for a short time after surgery, transitioning to pain medications by mouth allows for better management of pain.   Your nurse will encourage you to take oral pain medications whenever possible.  IV medications work almost immediately, but only last a short while.  Taking medications by mouth allows for a more constant level of medication in your blood stream for a longer period of time.      Once your pain is out of control it is harder to get back under control.  It is important you are aware when your next dose of pain medication is due.  If you are admitted, your nurse may write the time of your next dose on the white board in your room to help you remember.      We are interested in your pain and encourage you to inform us about aggravating factors during your visit.   Many times a simple repositioning every few hours can make a big difference.    If your physician says it is okay, do not let your pain prevent you from getting out of bed. Be sure to call your nurse for assistance prior to getting up so you do not fall.      Before surgery, please decide your tolerable pain goal.  These faces help describe the pain ratings we use on a 0-10 scale.   Be  prepared to tell us your goal and whether or not you take pain or anxiety medications at home.            BEFORE YOU COME TO THE HOSPITAL  (Pre-op instructions)  • Do not eat, drink, smoke or chew gum after midnight the night before surgery.  This also includes no mints.  • Morning of surgery take only the medicines you have been instructed with a sip of water unless otherwise instructed  by your physician.  • Do not shave, wear makeup or dark nail polish.  • Remove all jewelry including rings.  • Leave anything you consider valuable at home.  • Leave your suitcase in the car until after your surgery.  • Bring the following with you if applicable:  o Picture ID and insurance, Medicare or Medicaid cards  o Co-pay/deductible required by insurance (cash, check, credit card)  o Copy of advance directive, living will or power-of- documents if not brought to PAT  o CPAP or BIPAP mask and tubing  o Relaxation aids (MP3 player, book, magazine)  • On the day of surgery check in at registration located at the main entrance of the hospital.       Outpatient Surgery Guidelines, Adult  Outpatient procedures are those for which the person having the procedure is allowed to go home the same day as the procedure. Various procedures are done on an outpatient basis. You should follow some general guidelines if you will be having an outpatient procedure.  LET YOUR HEALTH CARE PROVIDER KNOW ABOUT:  · Any allergies you have.  · All medicines you are taking, including vitamins, herbs, eye drops, creams, and over-the-counter medicines.  · Previous problems you or members of your family have had with the use of anesthetics.  · Any blood disorders you have.  · Previous surgeries you have had.  · Medical conditions you have.  RISKS AND COMPLICATIONS  Your health care provider will discuss possible risks and complications with you before surgery. Common risks and complications include:    · Problems due to the use of  anesthetics.  · Blood loss and replacement (does not apply to minor surgical procedures).  · Temporary increase in pain due to surgery.  · Uncorrected pain or problems that the surgery was meant to correct.  · Infection.  · New damage.  BEFORE THE PROCEDURE  · Ask your health care provider about changing or stopping your regular medicines. You may need to stop taking certain medicines in the days or weeks before the procedure.  · Stop smoking at least 2 weeks before surgery. This lowers your risk for complications during and after surgery. Ask your health care provider for help with this if needed.  · Eat your usual meals and a light supper the day before surgery. Continue fluid intake. Do not drink alcohol.  · Do not eat or drink after midnight the night before your surgery.   · Arrange for someone to take you home and to stay with you for 24 hours after the procedure. Medicine given for your procedure may affect your ability to drive or to care for yourself.  · Call your health care provider's office if you develop an illness or problem that may prevent you from safely having your procedure.  AFTER THE PROCEDURE  After surgery, you will be taken to a recovery area, where your progress will be monitored. If there are no complications, you will be allowed to go home when you are awake, stable, and taking fluids well. You may have numbness around the surgical site. Healing will take some time. You will have tenderness at the surgical site and may have some swelling and bruising. You may also have some nausea.  HOME CARE INSTRUCTIONS  · Do not drive for 24 hours, or as directed by your health care provider. Do not drive while taking prescription pain medicines.  · Do not drink alcohol for 24 hours.  · Do not make important decisions or sign legal documents for 24 hours.  · You may resume a normal diet and activities as directed.  · Do not lift anything heavier than 10 pounds (4.5 kg) or play contact sports until your  health care provider says it is okay.  · Change your bandages (dressings) as directed.  · Only take over-the-counter or prescription medicines as directed by your health care provider.  · Follow up with your health care provider as directed.  SEEK MEDICAL CARE IF:  · You have increased bleeding (more than a small spot) from the surgical site.  · You have redness, swelling, or increasing pain in the wound.  · You see pus coming from the wound.  · You have a fever.  · You notice a bad smell coming from the wound or dressing.  · You feel lightheaded or faint.  · You develop a rash.  · You have trouble breathing.  · You develop allergies.  MAKE SURE YOU:  · Understand these instructions.  · Will watch your condition.  · Will get help right away if you are not doing well or get worse.     This information is not intended to replace advice given to you by your health care provider. Make sure you discuss any questions you have with your health care provider.     Document Released: 09/12/2002 Document Revised: 05/03/2016 Document Reviewed: 05/22/2014  [employer] Interactive Patient Education ©2016 [employer]       Fall Prevention in Hospitals, Adult  As a hospital patient, your condition and the treatments you receive can increase your risk for falls. Some additional risk factors for falls in a hospital include:  · Being in an unfamiliar environment.  · Being on bed rest.  · Your surgery.  · Taking certain medicines.  · Your tubing requirements, such as intravenous (IV) therapy or catheters.  It is important that you learn how to decrease fall risks while at the hospital. Below are important tips that can help prevent falls.  SAFETY TIPS FOR PREVENTING FALLS  Talk about your risk of falling.  · Ask your health care provider why you are at risk for falling. Is it your medicine, illness, tubing placement, or something else?  · Make a plan with your health care provider to keep you safe from falls.  · Ask your health care  provider or pharmacist about side effects of your medicines. Some medicines can make you dizzy or affect your coordination.  Ask for help.  · Ask for help before getting out of bed. You may need to press your call button.  · Ask for assistance in getting safely to the toilet.  · Ask for a walker or cane to be put at your bedside. Ask that most of the side rails on your bed be placed up before your health care provider leaves the room.  · Ask family or friends to sit with you.  · Ask for things that are out of your reach, such as your glasses, hearing aids, telephone, bedside table, or call button.  Follow these tips to avoid falling:  · Stay lying or seated, rather than standing, while waiting for help.  · Wear rubber-soled slippers or shoes whenever you walk in the hospital.  · Avoid quick, sudden movements.  ¨ Change positions slowly.  ¨ Sit on the side of your bed before standing.  ¨ Stand up slowly and wait before you start to walk.  · Let your health care provider know if there is a spill on the floor.  · Pay careful attention to the medical equipment, electrical cords, and tubes around you.  · When you need help, use your call button by your bed or in the bathroom. Wait for one of your health care providers to help you.  · If you feel dizzy or unsure of your footing, return to bed and wait for assistance.  · Avoid being distracted by the TV, telephone, or another person in your room.  · Do not lean or support yourself on rolling objects, such as IV poles or bedside tables.     This information is not intended to replace advice given to you by your health care provider. Make sure you discuss any questions you have with your health care provider.     Document Released: 12/15/2001 Document Revised: 01/08/2016 Document Reviewed: 08/25/2013  [employer] Interactive Patient Education ©2016 [employer]       Surgical Site Infections FAQs  What is a Surgical Site Infection (SSI)?  A surgical site infection is an  infection that occurs after surgery in the part of the body where the surgery took place. Most patients who have surgery do not develop an infection. However, infections develop in about 1 to 3 out of every 100 patients who have surgery.  Some of the common symptoms of a surgical site infection are:  · Redness and pain around the area where you had surgery  · Drainage of cloudy fluid from your surgical wound  · Fever  Can SSIs be treated?  Yes. Most surgical site infections can be treated with antibiotics. The antibiotic given to you depends on the bacteria (germs) causing the infection. Sometimes patients with SSIs also need another surgery to treat the infection.  What are some of the things that hospitals are doing to prevent SSIs?  To prevent SSIs, doctors, nurses, and other healthcare providers:  · Clean their hands and arms up to their elbows with an antiseptic agent just before the surgery.  · Clean their hands with soap and water or an alcohol-based hand rub before and after caring for each patient.  · May remove some of your hair immediately before your surgery using electric clippers if the hair is in the same area where the procedure will occur. They should not shave you with a razor.  · Wear special hair covers, masks, gowns, and gloves during surgery to keep the surgery area clean.  · Give you antibiotics before your surgery starts. In most cases, you should get antibiotics within 60 minutes before the surgery starts and the antibiotics should be stopped within 24 hours after surgery.  · Clean the skin at the site of your surgery with a special soap that kills germs.  What can I do to help prevent SSIs?  Before your surgery:  · Tell your doctor about other medical problems you may have. Health problems such as allergies, diabetes, and obesity could affect your surgery and your treatment.  · Quit smoking. Patients who smoke get more infections. Talk to your doctor about how you can quit before your  surgery.  · Do not shave near where you will have surgery. Shaving with a razor can irritate your skin and make it easier to develop an infection.  At the time of your surgery:  · Speak up if someone tries to shave you with a razor before surgery. Ask why you need to be shaved and talk with your surgeon if you have any concerns.  · Ask if you will get antibiotics before surgery.  After your surgery:  · Make sure that your healthcare providers clean their hands before examining you, either with soap and water or an alcohol-based hand rub.  · If you do not see your providers clean their hands, please ask them to do so.  · Family and friends who visit you should not touch the surgical wound or dressings.  · Family and friends should clean their hands with soap and water or an alcohol-based hand rub before and after visiting you. If you do not see them clean their hands, ask them to clean their hands.  What do I need to do when I go home from the hospital?  · Before you go home, your doctor or nurse should explain everything you need to know about taking care of your wound. Make sure you understand how to care for your wound before you leave the hospital.  · Always clean your hands before and after caring for your wound.  · Before you go home, make sure you know who to contact if you have questions or problems after you get home.  · If you have any symptoms of an infection, such as redness and pain at the surgery site, drainage, or fever, call your doctor immediately.  If you have additional questions, please ask your doctor or nurse.  Developed and co-sponsored by The Society for Healthcare Epidemiology of [place] (SHEA); Infectious Diseases Society of [place] (IDSA); American Hospital Association; Association for Professionals in Infection Control and Epidemiology (APIC); Centers for Disease Control and Prevention (CDC); and The Joint Commission.     This information is not intended to replace advice given to you by  your health care provider. Make sure you discuss any questions you have with your health care provider.     Document Released: 12/23/2014 Document Revised: 01/08/2016 Document Reviewed: 03/02/2016  [employer] Interactive Patient Education ©2016 [employer]     PATIENT/FAMILY/RESPONSIBLE PARTY VERBALIZES UNDERSTANDING OF ABOVE EDUCATION.  COPY OF PAIN SCALE GIVEN AND REVIEWED WITH VERBALIZED UNDERSTANDING.

## 2018-05-19 ENCOUNTER — RESULTS ENCOUNTER (OUTPATIENT)
Dept: OBSTETRICS AND GYNECOLOGY | Facility: CLINIC | Age: 38
End: 2018-05-19

## 2018-05-19 DIAGNOSIS — A63.0 CONDYLOMA ACUMINATUM OF VULVA: ICD-10-CM

## 2018-05-19 DIAGNOSIS — N87.9 CERVICAL DYSPLASIA: ICD-10-CM

## 2018-05-30 ENCOUNTER — HOSPITAL ENCOUNTER (OUTPATIENT)
Facility: HOSPITAL | Age: 38
Setting detail: HOSPITAL OUTPATIENT SURGERY
Discharge: HOME | End: 2018-05-30
Attending: OBSTETRICS & GYNECOLOGY | Admitting: OBSTETRICS & GYNECOLOGY

## 2018-05-30 ENCOUNTER — ANESTHESIA EVENT (OUTPATIENT)
Dept: PERIOP | Facility: HOSPITAL | Age: 38
End: 2018-05-30

## 2018-05-30 ENCOUNTER — ANESTHESIA (OUTPATIENT)
Dept: PERIOP | Facility: HOSPITAL | Age: 38
End: 2018-05-30

## 2018-05-30 VITALS
HEART RATE: 53 BPM | TEMPERATURE: 97.9 F | DIASTOLIC BLOOD PRESSURE: 61 MMHG | OXYGEN SATURATION: 98 % | SYSTOLIC BLOOD PRESSURE: 104 MMHG | RESPIRATION RATE: 18 BRPM

## 2018-05-30 DIAGNOSIS — A63.0 CONDYLOMA ACUMINATUM OF VULVA: ICD-10-CM

## 2018-05-30 DIAGNOSIS — N87.9 CERVICAL DYSPLASIA: ICD-10-CM

## 2018-05-30 LAB — B-HCG UR QL: NEGATIVE

## 2018-05-30 PROCEDURE — 11422 EXC H-F-NK-SP B9+MARG 1.1-2: CPT | Performed by: OBSTETRICS & GYNECOLOGY

## 2018-05-30 PROCEDURE — 25010000002 DEXAMETHASONE PER 1 MG: Performed by: NURSE ANESTHETIST, CERTIFIED REGISTERED

## 2018-05-30 PROCEDURE — 81025 URINE PREGNANCY TEST: CPT | Performed by: OBSTETRICS & GYNECOLOGY

## 2018-05-30 PROCEDURE — 88305 TISSUE EXAM BY PATHOLOGIST: CPT | Performed by: OBSTETRICS & GYNECOLOGY

## 2018-05-30 PROCEDURE — 25010000002 ONDANSETRON PER 1 MG: Performed by: NURSE ANESTHETIST, CERTIFIED REGISTERED

## 2018-05-30 PROCEDURE — 25010000002 KETOROLAC TROMETHAMINE PER 15 MG: Performed by: NURSE ANESTHETIST, CERTIFIED REGISTERED

## 2018-05-30 PROCEDURE — 57522 CONIZATION OF CERVIX: CPT | Performed by: OBSTETRICS & GYNECOLOGY

## 2018-05-30 PROCEDURE — 88307 TISSUE EXAM BY PATHOLOGIST: CPT | Performed by: OBSTETRICS & GYNECOLOGY

## 2018-05-30 PROCEDURE — 25010000002 PROPOFOL 10 MG/ML EMULSION: Performed by: NURSE ANESTHETIST, CERTIFIED REGISTERED

## 2018-05-30 PROCEDURE — 25010000002 FENTANYL CITRATE (PF) 100 MCG/2ML SOLUTION: Performed by: NURSE ANESTHETIST, CERTIFIED REGISTERED

## 2018-05-30 PROCEDURE — 25010000002 MIDAZOLAM PER 1 MG: Performed by: ANESTHESIOLOGY

## 2018-05-30 RX ORDER — MIDAZOLAM HYDROCHLORIDE 1 MG/ML
1 INJECTION INTRAMUSCULAR; INTRAVENOUS
Status: DISCONTINUED | OUTPATIENT
Start: 2018-05-30 | End: 2018-05-30 | Stop reason: HOSPADM

## 2018-05-30 RX ORDER — OXYCODONE AND ACETAMINOPHEN 7.5; 325 MG/1; MG/1
2 TABLET ORAL ONCE AS NEEDED
Status: DISCONTINUED | OUTPATIENT
Start: 2018-05-30 | End: 2018-05-30 | Stop reason: HOSPADM

## 2018-05-30 RX ORDER — IPRATROPIUM BROMIDE AND ALBUTEROL SULFATE 2.5; .5 MG/3ML; MG/3ML
3 SOLUTION RESPIRATORY (INHALATION) ONCE AS NEEDED
Status: DISCONTINUED | OUTPATIENT
Start: 2018-05-30 | End: 2018-05-30 | Stop reason: HOSPADM

## 2018-05-30 RX ORDER — ONDANSETRON 2 MG/ML
4 INJECTION INTRAMUSCULAR; INTRAVENOUS ONCE AS NEEDED
Status: DISCONTINUED | OUTPATIENT
Start: 2018-05-30 | End: 2018-05-30 | Stop reason: HOSPADM

## 2018-05-30 RX ORDER — SODIUM CHLORIDE 0.9 % (FLUSH) 0.9 %
3 SYRINGE (ML) INJECTION AS NEEDED
Status: DISCONTINUED | OUTPATIENT
Start: 2018-05-30 | End: 2018-05-30 | Stop reason: HOSPADM

## 2018-05-30 RX ORDER — HYDROCODONE BITARTRATE AND ACETAMINOPHEN 5; 325 MG/1; MG/1
1 TABLET ORAL ONCE AS NEEDED
Status: CANCELLED | OUTPATIENT
Start: 2018-05-30 | End: 2018-06-09

## 2018-05-30 RX ORDER — IBUPROFEN 600 MG/1
600 TABLET ORAL ONCE AS NEEDED
Status: DISCONTINUED | OUTPATIENT
Start: 2018-05-30 | End: 2018-05-30 | Stop reason: HOSPADM

## 2018-05-30 RX ORDER — MEPERIDINE HYDROCHLORIDE 50 MG/ML
12.5 INJECTION INTRAMUSCULAR; INTRAVENOUS; SUBCUTANEOUS
Status: DISCONTINUED | OUTPATIENT
Start: 2018-05-30 | End: 2018-05-30 | Stop reason: HOSPADM

## 2018-05-30 RX ORDER — ONDANSETRON 2 MG/ML
INJECTION INTRAMUSCULAR; INTRAVENOUS AS NEEDED
Status: DISCONTINUED | OUTPATIENT
Start: 2018-05-30 | End: 2018-05-30 | Stop reason: SURG

## 2018-05-30 RX ORDER — LIDOCAINE HYDROCHLORIDE 20 MG/ML
INJECTION, SOLUTION INFILTRATION; PERINEURAL AS NEEDED
Status: DISCONTINUED | OUTPATIENT
Start: 2018-05-30 | End: 2018-05-30 | Stop reason: SURG

## 2018-05-30 RX ORDER — HYDROCODONE BITARTRATE AND ACETAMINOPHEN 5; 325 MG/1; MG/1
1 TABLET ORAL EVERY 4 HOURS PRN
Qty: 8 TABLET | Refills: 0 | Status: SHIPPED | OUTPATIENT
Start: 2018-05-30 | End: 2018-06-12

## 2018-05-30 RX ORDER — DEXAMETHASONE SODIUM PHOSPHATE 4 MG/ML
INJECTION, SOLUTION INTRA-ARTICULAR; INTRALESIONAL; INTRAMUSCULAR; INTRAVENOUS; SOFT TISSUE AS NEEDED
Status: DISCONTINUED | OUTPATIENT
Start: 2018-05-30 | End: 2018-05-30 | Stop reason: SURG

## 2018-05-30 RX ORDER — MIDAZOLAM HYDROCHLORIDE 1 MG/ML
2 INJECTION INTRAMUSCULAR; INTRAVENOUS
Status: DISCONTINUED | OUTPATIENT
Start: 2018-05-30 | End: 2018-05-30 | Stop reason: HOSPADM

## 2018-05-30 RX ORDER — PROPOFOL 10 MG/ML
VIAL (ML) INTRAVENOUS AS NEEDED
Status: DISCONTINUED | OUTPATIENT
Start: 2018-05-30 | End: 2018-05-30 | Stop reason: SURG

## 2018-05-30 RX ORDER — LUGOLS 0.4 G/G
LIQUID TOPICAL AS NEEDED
Status: DISCONTINUED | OUTPATIENT
Start: 2018-05-30 | End: 2018-05-30 | Stop reason: HOSPADM

## 2018-05-30 RX ORDER — SODIUM CHLORIDE, SODIUM LACTATE, POTASSIUM CHLORIDE, CALCIUM CHLORIDE 600; 310; 30; 20 MG/100ML; MG/100ML; MG/100ML; MG/100ML
9 INJECTION, SOLUTION INTRAVENOUS CONTINUOUS
Status: DISCONTINUED | OUTPATIENT
Start: 2018-05-30 | End: 2018-05-30 | Stop reason: HOSPADM

## 2018-05-30 RX ORDER — LIDOCAINE HYDROCHLORIDE AND EPINEPHRINE 10; 10 MG/ML; UG/ML
INJECTION, SOLUTION INFILTRATION; PERINEURAL AS NEEDED
Status: DISCONTINUED | OUTPATIENT
Start: 2018-05-30 | End: 2018-05-30 | Stop reason: HOSPADM

## 2018-05-30 RX ORDER — LABETALOL HYDROCHLORIDE 5 MG/ML
5 INJECTION, SOLUTION INTRAVENOUS
Status: DISCONTINUED | OUTPATIENT
Start: 2018-05-30 | End: 2018-05-30 | Stop reason: HOSPADM

## 2018-05-30 RX ORDER — NALOXONE HCL 0.4 MG/ML
0.4 VIAL (ML) INJECTION AS NEEDED
Status: DISCONTINUED | OUTPATIENT
Start: 2018-05-30 | End: 2018-05-30 | Stop reason: HOSPADM

## 2018-05-30 RX ORDER — SODIUM CHLORIDE 0.9 % (FLUSH) 0.9 %
1-10 SYRINGE (ML) INJECTION AS NEEDED
Status: DISCONTINUED | OUTPATIENT
Start: 2018-05-30 | End: 2018-05-30 | Stop reason: HOSPADM

## 2018-05-30 RX ORDER — SODIUM CHLORIDE, SODIUM LACTATE, POTASSIUM CHLORIDE, CALCIUM CHLORIDE 600; 310; 30; 20 MG/100ML; MG/100ML; MG/100ML; MG/100ML
1000 INJECTION, SOLUTION INTRAVENOUS CONTINUOUS
Status: DISCONTINUED | OUTPATIENT
Start: 2018-05-30 | End: 2018-05-30 | Stop reason: HOSPADM

## 2018-05-30 RX ORDER — METOCLOPRAMIDE HYDROCHLORIDE 5 MG/ML
5 INJECTION INTRAMUSCULAR; INTRAVENOUS
Status: DISCONTINUED | OUTPATIENT
Start: 2018-05-30 | End: 2018-05-30 | Stop reason: HOSPADM

## 2018-05-30 RX ORDER — FENTANYL CITRATE 50 UG/ML
25 INJECTION, SOLUTION INTRAMUSCULAR; INTRAVENOUS AS NEEDED
Status: DISCONTINUED | OUTPATIENT
Start: 2018-05-30 | End: 2018-05-30 | Stop reason: HOSPADM

## 2018-05-30 RX ORDER — SODIUM CHLORIDE 9 MG/ML
100 INJECTION, SOLUTION INTRAVENOUS CONTINUOUS
Status: DISCONTINUED | OUTPATIENT
Start: 2018-05-30 | End: 2018-05-30 | Stop reason: HOSPADM

## 2018-05-30 RX ORDER — OXYCODONE AND ACETAMINOPHEN 10; 325 MG/1; MG/1
1 TABLET ORAL ONCE AS NEEDED
Status: DISCONTINUED | OUTPATIENT
Start: 2018-05-30 | End: 2018-05-30 | Stop reason: HOSPADM

## 2018-05-30 RX ORDER — FENTANYL CITRATE 50 UG/ML
INJECTION, SOLUTION INTRAMUSCULAR; INTRAVENOUS AS NEEDED
Status: DISCONTINUED | OUTPATIENT
Start: 2018-05-30 | End: 2018-05-30 | Stop reason: SURG

## 2018-05-30 RX ORDER — KETOROLAC TROMETHAMINE 30 MG/ML
INJECTION, SOLUTION INTRAMUSCULAR; INTRAVENOUS AS NEEDED
Status: DISCONTINUED | OUTPATIENT
Start: 2018-05-30 | End: 2018-05-30 | Stop reason: SURG

## 2018-05-30 RX ORDER — FENTANYL CITRATE 50 UG/ML
25 INJECTION, SOLUTION INTRAMUSCULAR; INTRAVENOUS
Status: DISCONTINUED | OUTPATIENT
Start: 2018-05-30 | End: 2018-05-30 | Stop reason: HOSPADM

## 2018-05-30 RX ORDER — DEXTROSE MONOHYDRATE 25 G/50ML
12.5 INJECTION, SOLUTION INTRAVENOUS AS NEEDED
Status: DISCONTINUED | OUTPATIENT
Start: 2018-05-30 | End: 2018-05-30 | Stop reason: HOSPADM

## 2018-05-30 RX ADMIN — LIDOCAINE HYDROCHLORIDE 100 MG: 20 INJECTION, SOLUTION INFILTRATION; PERINEURAL at 07:15

## 2018-05-30 RX ADMIN — PROPOFOL 100 MG: 10 INJECTION, EMULSION INTRAVENOUS at 07:15

## 2018-05-30 RX ADMIN — ONDANSETRON HYDROCHLORIDE 4 MG: 2 SOLUTION INTRAMUSCULAR; INTRAVENOUS at 07:23

## 2018-05-30 RX ADMIN — FENTANYL CITRATE 100 MCG: 50 INJECTION, SOLUTION INTRAMUSCULAR; INTRAVENOUS at 07:12

## 2018-05-30 RX ADMIN — SODIUM CHLORIDE, POTASSIUM CHLORIDE, SODIUM LACTATE AND CALCIUM CHLORIDE 1000 ML: 600; 310; 30; 20 INJECTION, SOLUTION INTRAVENOUS at 06:05

## 2018-05-30 RX ADMIN — EPHEDRINE SULFATE 5 MG: 50 INJECTION INTRAMUSCULAR; INTRAVENOUS; SUBCUTANEOUS at 07:21

## 2018-05-30 RX ADMIN — FENTANYL CITRATE 50 MCG: 50 INJECTION, SOLUTION INTRAMUSCULAR; INTRAVENOUS at 07:34

## 2018-05-30 RX ADMIN — PROPOFOL 100 MG: 10 INJECTION, EMULSION INTRAVENOUS at 07:32

## 2018-05-30 RX ADMIN — MIDAZOLAM HYDROCHLORIDE 2 MG: 1 INJECTION, SOLUTION INTRAMUSCULAR; INTRAVENOUS at 06:53

## 2018-05-30 RX ADMIN — DEXAMETHASONE SODIUM PHOSPHATE 4 MG: 4 INJECTION, SOLUTION INTRAMUSCULAR; INTRAVENOUS at 07:23

## 2018-05-30 RX ADMIN — LIDOCAINE HYDROCHLORIDE 0.5 ML: 10 INJECTION, SOLUTION EPIDURAL; INFILTRATION; INTRACAUDAL; PERINEURAL at 06:05

## 2018-05-30 RX ADMIN — FENTANYL CITRATE 50 MCG: 50 INJECTION, SOLUTION INTRAMUSCULAR; INTRAVENOUS at 08:02

## 2018-05-30 RX ADMIN — KETOROLAC TROMETHAMINE 30 MG: 30 INJECTION, SOLUTION INTRAMUSCULAR at 07:54

## 2018-05-30 NOTE — ANESTHESIA PROCEDURE NOTES
Airway  Urgency: elective    Airway not difficult    General Information and Staff    Patient location during procedure: OR  CRNA: PAULINO DUENAS    Indications and Patient Condition  Indications for airway management: airway protection    Preoxygenated: yes  Mask difficulty assessment: 0 - not attempted    Final Airway Details  Final airway type: supraglottic airway      Successful airway: I-gel  Size 4    Number of attempts at approach: 1    Additional Comments  Atraumatic

## 2018-05-30 NOTE — ANESTHESIA POSTPROCEDURE EVALUATION
Patient: Aaliyah Obando    Procedure Summary     Date:  05/30/18 Room / Location:  [facility] OR  /  [facility] OR    Anesthesia Start:  0712 Anesthesia Stop:  0810    Procedures:       CERVICAL CONIZATION (N/A Cervix)      Excision vulvar lesion (N/A Vulva)      LOOP ELECTROCAUTERY EXCISION PROCEDURE (N/A Cervix) Diagnosis:       Cervical dysplasia      Condyloma acuminatum of vulva      (Cervical dysplasia [N87.9])      (Condyloma acuminatum of vulva [A63.0])    Surgeon:  Bautista Ross MD Provider:  Nolvia Mena CRNA    Anesthesia Type:  general ASA Status:  2          Anesthesia Type: general  Last vitals  BP   104/61 (05/30/18 0917)   Temp   97.9 °F (36.6 °C) (05/30/18 0825)   Pulse   53 (05/30/18 0917)   Resp   18 (05/30/18 0917)     SpO2   98 % (05/30/18 0917)     Post Anesthesia Care and Evaluation    Patient location during evaluation: PACU  Patient participation: complete - patient participated  Level of consciousness: awake and alert  Pain management: adequate  Airway patency: patent  Anesthetic complications: No anesthetic complications  PONV Status: none  Cardiovascular status: acceptable and hemodynamically stable  Respiratory status: acceptable  Hydration status: acceptable    Comments: Blood pressure 104/61, pulse 53, temperature 97.9 °F (36.6 °C), temperature source Temporal Artery , resp. rate 18, SpO2 98 %, not currently breastfeeding.    Patient discharged from PACU based upon Bryant score. Please see RN notes for further details

## 2018-05-30 NOTE — ANESTHESIA PREPROCEDURE EVALUATION
Anesthesia Evaluation     Patient summary reviewed   no history of anesthetic complications:  NPO Solid Status: > 8 hours             Airway   Mallampati: II  TM distance: >3 FB  Neck ROM: full  Dental    (+) upper dentures        Pulmonary    (+) a smoker, asthma,   Cardiovascular - negative cardio ROS  Exercise tolerance: excellent (>7 METS)        Neuro/Psych- negative ROS  GI/Hepatic/Renal/Endo    (-) GERD, liver disease, no renal disease, diabetes    Musculoskeletal     Abdominal    Substance History      OB/GYN    (-)  Pregnant        Other                        Anesthesia Plan    ASA 2     general     intravenous induction   Anesthetic plan and risks discussed with patient.

## 2018-06-01 LAB
CYTO UR: NORMAL
LAB AP CASE REPORT: NORMAL
Lab: NORMAL
PATH REPORT.FINAL DX SPEC: NORMAL
PATH REPORT.GROSS SPEC: NORMAL

## 2018-06-12 ENCOUNTER — OFFICE VISIT (OUTPATIENT)
Dept: OBSTETRICS AND GYNECOLOGY | Facility: CLINIC | Age: 38
End: 2018-06-12

## 2018-06-12 VITALS
SYSTOLIC BLOOD PRESSURE: 110 MMHG | HEIGHT: 70 IN | BODY MASS INDEX: 23.19 KG/M2 | WEIGHT: 162 LBS | DIASTOLIC BLOOD PRESSURE: 74 MMHG

## 2018-06-12 DIAGNOSIS — Z09 POSTOP CHECK: Primary | ICD-10-CM

## 2018-06-12 PROCEDURE — 99024 POSTOP FOLLOW-UP VISIT: CPT | Performed by: OBSTETRICS & GYNECOLOGY

## 2018-06-12 NOTE — PROGRESS NOTES
Attempted to obtain health maintenance information, patient unable to provide answers for the following items Tdap. Patient has never had pneumonia vaccine.

## 2018-06-12 NOTE — PROGRESS NOTES
"Aaliyah Obando is a 37 y.o. female here today for a postop visit after undergoing LEEP and excision of a vulvar lesion on May 30.  She has not had any fevers, but she reports that her vulvar incision has  about 9 days ago.  She has been cleaning it with peroxide daily.  Her pathology report returned showing CIN1 and a benign seborrheic keratosis.    /74 (BP Location: Left arm, Patient Position: Sitting)   Ht 177.8 cm (70\")   Wt 73.5 kg (162 lb)   LMP 05/27/2018 (Exact Date)   Breastfeeding? No   BMI 23.24 kg/m²    In general pleasant female in no acute distress  Abdomen is soft nontender nondistended  The incision of the right vulva has superficially  but the base is granulating well.  There is no surrounding erythema or induration.  There is no purulent drainage.    Assessment: postoperative visit after LEEP and excision of a vulvar lesion, superficial surgical wound separation    Aaliyah Obando will return in 10 days to recheck her wound.  She will rinse the wound 3 times a day and I have instructed her to stop using peroxide as that can delay wound healing.  She will keep the area clean and dry.  May use Neosporin if she likes.  In the meantime if she has questions or problems she will call the office.     "

## 2018-06-19 ENCOUNTER — OFFICE VISIT (OUTPATIENT)
Dept: OBSTETRICS AND GYNECOLOGY | Facility: CLINIC | Age: 38
End: 2018-06-19

## 2018-06-19 VITALS
BODY MASS INDEX: 23.19 KG/M2 | DIASTOLIC BLOOD PRESSURE: 68 MMHG | HEIGHT: 70 IN | SYSTOLIC BLOOD PRESSURE: 108 MMHG | WEIGHT: 162 LBS

## 2018-06-19 DIAGNOSIS — Z09 POSTOP CHECK: Primary | ICD-10-CM

## 2018-06-19 DIAGNOSIS — T81.31XD SUPERFICIAL DISRUPTION OR DEHISCENCE OF OPERATION WOUND, SUBSEQUENT ENCOUNTER: ICD-10-CM

## 2018-06-19 PROCEDURE — 99024 POSTOP FOLLOW-UP VISIT: CPT | Performed by: OBSTETRICS & GYNECOLOGY

## 2018-06-19 NOTE — PROGRESS NOTES
"Aaliyah Obando is a 37 y.o. female here for a recheck of her vulvar wound.  3 weeks ago she had excision of a condylomatous mass which turned out to be a seborrheic keratosis.  She had a superficial breakdown of her vulvar incision.  She denies pain or drainage.    Visit Vitals  /68 (BP Location: Left arm, Patient Position: Sitting)   Ht 177.8 cm (70\")   Wt 73.5 kg (162 lb)   LMP 05/27/2018 (Exact Date)   BMI 23.24 kg/m²     Pleasant female no acute distress  Abdomen nontender  The incision of the right vulva has superficially  but the base is granulating well.  There is no surrounding erythema or induration.  There is no purulent drainage.  Improving since last exam.    Assessment: Superficial disruption of surgical wound    She will continue keeping the area clean and dry.  She will follow-up in a couple of weeks for repeat evaluation and call beforehand if she has concerns.    "

## 2018-07-03 ENCOUNTER — OFFICE VISIT (OUTPATIENT)
Dept: OBSTETRICS AND GYNECOLOGY | Facility: CLINIC | Age: 38
End: 2018-07-03

## 2018-07-03 VITALS
HEIGHT: 70 IN | DIASTOLIC BLOOD PRESSURE: 70 MMHG | SYSTOLIC BLOOD PRESSURE: 104 MMHG | WEIGHT: 161 LBS | BODY MASS INDEX: 23.05 KG/M2

## 2018-07-03 DIAGNOSIS — Z09 POSTOP CHECK: Primary | ICD-10-CM

## 2018-07-03 PROCEDURE — 99024 POSTOP FOLLOW-UP VISIT: CPT | Performed by: OBSTETRICS & GYNECOLOGY

## 2018-07-03 NOTE — PROGRESS NOTES
"Aaliyah Obando is a 37 y.o. female here today for a postop visit after undergoing LEEP and excision of a vulvar mass on May 30.  She has been doing well since her last visit.  She did have a superficial wound breakdown of her vulvar repair.  Her pathology report returned showing CONNER-1 and a benign seborrheic keratosis.    /70 (BP Location: Right arm, Patient Position: Sitting)   Ht 177.8 cm (70\")   Wt 73 kg (161 lb)   LMP 06/19/2018 (Exact Date)   Breastfeeding? No   BMI 23.10 kg/m²    In general pleasant female in no acute distress  Abdomen is soft nontender nondistended  Her vulvar incision is well-healed  On speculum examination the cervix is well-healed    Assessment: Normal postoperative visit after LEEP    Aaliyah Obando will return in one year for a repeat Pap smear.  In the meantime if she has questions or problems she will call the office.    "

## 2019-01-23 ENCOUNTER — HOSPITAL ENCOUNTER (EMERGENCY)
Facility: HOSPITAL | Age: 39
Discharge: HOME | End: 2019-01-23
Attending: EMERGENCY MEDICINE | Admitting: EMERGENCY MEDICINE

## 2019-01-23 ENCOUNTER — APPOINTMENT (OUTPATIENT)
Dept: ULTRASOUND IMAGING | Facility: HOSPITAL | Age: 39
End: 2019-01-23

## 2019-01-23 VITALS
SYSTOLIC BLOOD PRESSURE: 119 MMHG | HEIGHT: 69 IN | TEMPERATURE: 98.6 F | OXYGEN SATURATION: 99 % | RESPIRATION RATE: 16 BRPM | WEIGHT: 172 LBS | HEART RATE: 63 BPM | DIASTOLIC BLOOD PRESSURE: 71 MMHG | BODY MASS INDEX: 25.48 KG/M2

## 2019-01-23 DIAGNOSIS — R10.2 PELVIC PAIN: Primary | ICD-10-CM

## 2019-01-23 LAB
ABO GROUP BLD: NORMAL
ALBUMIN SERPL-MCNC: 4.2 G/DL (ref 3.5–5)
ALBUMIN/GLOB SERPL: 1.7 G/DL (ref 1.1–2.5)
ALP SERPL-CCNC: 46 U/L (ref 24–120)
ALT SERPL W P-5'-P-CCNC: 23 U/L (ref 0–54)
ANION GAP SERPL CALCULATED.3IONS-SCNC: 10 MMOL/L (ref 4–13)
AST SERPL-CCNC: 31 U/L (ref 7–45)
BASOPHILS # BLD AUTO: 0.06 10*3/MM3 (ref 0–0.2)
BASOPHILS NFR BLD AUTO: 0.6 % (ref 0–2)
BILIRUB SERPL-MCNC: 0.5 MG/DL (ref 0.1–1)
BUN BLD-MCNC: 19 MG/DL (ref 5–21)
BUN/CREAT SERPL: 27.5 (ref 7–25)
CALCIUM SPEC-SCNC: 9.6 MG/DL (ref 8.4–10.4)
CHLORIDE SERPL-SCNC: 102 MMOL/L (ref 98–110)
CO2 SERPL-SCNC: 25 MMOL/L (ref 24–31)
CREAT BLD-MCNC: 0.69 MG/DL (ref 0.5–1.4)
DEPRECATED RDW RBC AUTO: 41.8 FL (ref 40–54)
EOSINOPHIL # BLD AUTO: 0.56 10*3/MM3 (ref 0–0.7)
EOSINOPHIL NFR BLD AUTO: 5.6 % (ref 0–4)
ERYTHROCYTE [DISTWIDTH] IN BLOOD BY AUTOMATED COUNT: 12.6 % (ref 12–15)
GFR SERPL CREATININE-BSD FRML MDRD: 94 ML/MIN/1.73
GLOBULIN UR ELPH-MCNC: 2.5 GM/DL
GLUCOSE BLD-MCNC: 110 MG/DL (ref 70–100)
HCG INTACT+B SERPL-ACNC: <2.39 MIU/ML (ref 0–5)
HCT VFR BLD AUTO: 38.9 % (ref 37–47)
HGB BLD-MCNC: 13.2 G/DL (ref 12–16)
IMM GRANULOCYTES # BLD AUTO: 0.03 10*3/MM3 (ref 0–0.03)
IMM GRANULOCYTES NFR BLD AUTO: 0.3 % (ref 0–5)
LYMPHOCYTES # BLD AUTO: 2.55 10*3/MM3 (ref 0.72–4.86)
LYMPHOCYTES NFR BLD AUTO: 25.6 % (ref 15–45)
MCH RBC QN AUTO: 30.6 PG (ref 28–32)
MCHC RBC AUTO-ENTMCNC: 33.9 G/DL (ref 33–36)
MCV RBC AUTO: 90 FL (ref 82–98)
MONOCYTES # BLD AUTO: 0.87 10*3/MM3 (ref 0.19–1.3)
MONOCYTES NFR BLD AUTO: 8.7 % (ref 4–12)
NEUTROPHILS # BLD AUTO: 5.9 10*3/MM3 (ref 1.87–8.4)
NEUTROPHILS NFR BLD AUTO: 59.2 % (ref 39–78)
NRBC BLD AUTO-RTO: 0 /100 WBC (ref 0–0)
PLATELET # BLD AUTO: 260 10*3/MM3 (ref 130–400)
PMV BLD AUTO: 10.3 FL (ref 6–12)
POTASSIUM BLD-SCNC: 3.6 MMOL/L (ref 3.5–5.3)
PROT SERPL-MCNC: 6.7 G/DL (ref 6.3–8.7)
RBC # BLD AUTO: 4.32 10*6/MM3 (ref 4.2–5.4)
RH BLD: POSITIVE
SODIUM BLD-SCNC: 137 MMOL/L (ref 135–145)
WBC NRBC COR # BLD: 9.97 10*3/MM3 (ref 4.8–10.8)

## 2019-01-23 PROCEDURE — 85025 COMPLETE CBC W/AUTO DIFF WBC: CPT | Performed by: EMERGENCY MEDICINE

## 2019-01-23 PROCEDURE — 76817 TRANSVAGINAL US OBSTETRIC: CPT

## 2019-01-23 PROCEDURE — 80053 COMPREHEN METABOLIC PANEL: CPT | Performed by: EMERGENCY MEDICINE

## 2019-01-23 PROCEDURE — 99283 EMERGENCY DEPT VISIT LOW MDM: CPT

## 2019-01-23 PROCEDURE — 86901 BLOOD TYPING SEROLOGIC RH(D): CPT | Performed by: EMERGENCY MEDICINE

## 2019-01-23 PROCEDURE — 86900 BLOOD TYPING SEROLOGIC ABO: CPT | Performed by: EMERGENCY MEDICINE

## 2019-01-23 PROCEDURE — 84702 CHORIONIC GONADOTROPIN TEST: CPT | Performed by: EMERGENCY MEDICINE

## 2019-01-23 RX ORDER — PRENATAL VIT NO.126/IRON/FOLIC 28MG-0.8MG
TABLET ORAL DAILY
COMMUNITY
End: 2019-01-23

## 2019-01-23 RX ORDER — SODIUM CHLORIDE 0.9 % (FLUSH) 0.9 %
10 SYRINGE (ML) INJECTION AS NEEDED
Status: DISCONTINUED | OUTPATIENT
Start: 2019-01-23 | End: 2019-01-24 | Stop reason: HOSPADM

## 2019-01-24 NOTE — ED PROVIDER NOTES
Subjective   Patient presents with a complaint of vaginal bleeding that is mostly bright red a moderate amount along with pain in her lower abdomen mostly along the right side at all started today.  She says that her last menstrual cycle was 12/02/2018 and she fell on a couple weeks ago that she was somewhere between 4 and 6 weeks pregnant.  She has appointment with OB/GYN next week but with this pain and bleeding now was told to come to the emergency from for further evaluation.  She has had 3 previous children with no problems.        History provided by:  Patient   used: No    Vaginal Bleeding   Quality:  Spotting  Severity:  Moderate  Onset quality:  Sudden  Duration:  2 hours  Timing:  Constant  Progression:  Worsening  Chronicity:  New  Menstrual history:  Regular  Possible pregnancy: yes    Context: spontaneously    Context: not after intercourse, not after urination, not at rest, not during intercourse, not during urination, not foreign body and not genital trauma    Relieved by:  Nothing  Worsened by:  Nothing  Ineffective treatments:  None tried  Associated symptoms: abdominal pain    Associated symptoms: no back pain, no dizziness, no dyspareunia, no dysuria, no fatigue, no fever, no nausea and no vaginal discharge    Risk factors: no bleeding disorder, no hx of ectopic pregnancy, no hx of endometriosis, no gynecological surgery, does not have multiple partners, no new sexual partner, no ovarian cysts, no ovarian torsion, no PID, no prior miscarriage, no STD, no STD exposure, no terminated pregnancies and does not have unprotected sex        Review of Systems   Constitutional: Negative.  Negative for fatigue and fever.   HENT: Negative.    Respiratory: Negative.    Cardiovascular: Negative.    Gastrointestinal: Positive for abdominal pain. Negative for nausea.   Genitourinary: Positive for vaginal bleeding. Negative for dyspareunia, dysuria and vaginal discharge.   Musculoskeletal:  Negative.  Negative for back pain.   Skin: Negative.    Neurological: Negative.  Negative for dizziness.   Hematological: Negative.    Psychiatric/Behavioral: Negative.    All other systems reviewed and are negative.      Past Medical History:   Diagnosis Date   • Asthma    • Dysplasia of cervix    • Gallbladder abscess    • History of bronchitis    • HPV in female    • Smoker    • Vulvar lesion        No Known Allergies    Past Surgical History:   Procedure Laterality Date   • CERVICAL CONIZATION N/A 5/30/2018    Procedure: CERVICAL CONIZATION;  Surgeon: Bautista Ross MD;  Location: [facility] OR;  Service: Obstetrics/Gynecology   • CHOLECYSTECTOMY     • LEEP N/A 5/30/2018    Procedure: LOOP ELECTROCAUTERY EXCISION PROCEDURE;  Surgeon: Bautista Ross MD;  Location: [facility] OR;  Service: Obstetrics/Gynecology   • VULVA BIOPSY N/A 5/30/2018    Procedure: Excision vulvar lesion;  Surgeon: Bautista Ross MD;  Location: [facility] OR;  Service: Obstetrics/Gynecology       Family History   Problem Relation Age of Onset   • Cancer Mother    • Heart disease Mother    • Diabetes Mother    • Stroke Mother    • Obesity Mother    • Cancer Father    • Heart disease Father    • Diabetes Father    • Stroke Father    • Lung disease Father    • Diabetes Sister    • Cancer Maternal Grandmother    • Heart disease Maternal Grandmother    • Diabetes Maternal Grandmother    • Lung disease Maternal Grandmother    • Stroke Maternal Grandmother    • Obesity Maternal Grandmother    • Ovarian cancer Maternal Grandmother    • Cancer Maternal Grandfather    • Heart disease Maternal Grandfather    • Diabetes Maternal Grandfather    • Lung disease Maternal Grandfather    • Stroke Maternal Grandfather    • Obesity Maternal Grandfather    • Cancer Paternal Grandmother    • Heart disease Paternal Grandmother    • Diabetes Paternal Grandmother    • Lung disease Paternal Grandmother    • Stroke Paternal Grandmother    • Obesity Paternal  Grandmother    • Cancer Paternal Grandfather    • Heart disease Paternal Grandfather    • Diabetes Paternal Grandfather    • Lung disease Paternal Grandfather    • Stroke Paternal Grandfather    • Obesity Paternal Grandfather    • Colon cancer Paternal Grandfather    • Breast cancer Neg Hx    • Uterine cancer Neg Hx    • Melanoma Neg Hx        Social History     Socioeconomic History   • Marital status: Single     Spouse name: Not on file   • Number of children: Not on file   • Years of education: Not on file   • Highest education level: Not on file   Tobacco Use   • Smoking status: Current Every Day Smoker     Packs/day: 0.15     Types: Cigarettes     Last attempt to quit: 11/15/2014     Years since quittin.1   • Smokeless tobacco: Never Used   • Tobacco comment: SMOKES 5 CIGARETTE PER DAY   Substance and Sexual Activity   • Alcohol use: No   • Drug use: No   • Sexual activity: Defer       Prior to Admission medications    Medication Sig Start Date End Date Taking? Authorizing Provider   Prenatal Vit-Fe Fumarate-FA (PRENATAL, CLASSIC, VITAMIN) 28-0.8 MG tablet tablet Take  by mouth Daily.   Yes Provider, Historical, MD       Medications   sodium chloride 0.9 % flush 10 mL (not administered)       Vitals:    19 2256   BP: 113/58   Pulse:    Resp:    Temp:    SpO2: 97%         Objective   Physical Exam   Constitutional: She appears well-developed and well-nourished.   HENT:   Head: Normocephalic and atraumatic.   Cardiovascular: Normal rate and regular rhythm.   Pulmonary/Chest: Effort normal and breath sounds normal.   Abdominal: Soft. Normal appearance and bowel sounds are normal. There is tenderness in the right lower quadrant.   Neurological: She is alert.   Skin: Skin is warm and dry.   Psychiatric: She has a normal mood and affect.   Nursing note and vitals reviewed.      Procedures         Lab Results (last 24 hours)     Procedure Component Value Units Date/Time    CBC & Differential [[id number]]  Collected:  01/23/19 2118    Specimen:  Blood Updated:  01/23/19 2137    Narrative:       The following orders were created for panel order CBC & Differential.  Procedure                               Abnormality         Status                     ---------                               -----------         ------                     CBC Auto Differential[[id number]]        Abnormal            Final result                 Please view results for these tests on the individual orders.    Comprehensive Metabolic Panel [[id number]]  (Abnormal) Collected:  01/23/19 2118    Specimen:  Blood Updated:  01/23/19 2202     Glucose 110 mg/dL      BUN 19 mg/dL      Creatinine 0.69 mg/dL      Sodium 137 mmol/L      Potassium 3.6 mmol/L      Chloride 102 mmol/L      CO2 25.0 mmol/L      Calcium 9.6 mg/dL      Total Protein 6.7 g/dL      Albumin 4.20 g/dL      ALT (SGPT) 23 U/L      AST (SGOT) 31 U/L      Alkaline Phosphatase 46 U/L      Total Bilirubin 0.5 mg/dL      eGFR Non African Amer 94 mL/min/1.73      Globulin 2.5 gm/dL      A/G Ratio 1.7 g/dL      BUN/Creatinine Ratio 27.5     Anion Gap 10.0 mmol/L     hCG, Quantitative, Pregnancy [[id number]]  (Normal) Collected:  01/23/19 2118    Specimen:  Blood Updated:  01/23/19 2203     HCG Quantitative <2.39 mIU/mL     CBC Auto Differential [[id number]]  (Abnormal) Collected:  01/23/19 2118    Specimen:  Blood Updated:  01/23/19 2137     WBC 9.97 10*3/mm3      RBC 4.32 10*6/mm3      Hemoglobin 13.2 g/dL      Hematocrit 38.9 %      MCV 90.0 fL      MCH 30.6 pg      MCHC 33.9 g/dL      RDW 12.6 %      RDW-SD 41.8 fl      MPV 10.3 fL      Platelets 260 10*3/mm3      Neutrophil % 59.2 %      Lymphocyte % 25.6 %      Monocyte % 8.7 %      Eosinophil % 5.6 %      Basophil % 0.6 %      Immature Grans % 0.3 %      Neutrophils, Absolute 5.90 10*3/mm3      Lymphocytes, Absolute 2.55 10*3/mm3      Monocytes, Absolute 0.87 10*3/mm3      Eosinophils, Absolute 0.56 10*3/mm3      Basophils, Absolute  0.06 10*3/mm3      Immature Grans, Absolute 0.03 10*3/mm3      nRBC 0.0 /100 WBC           US Ob Transvaginal    (Results Pending)       ED Course  ED Course as of Jan 23 2316 Wed Jan 23, 2019 2314 I told the patient that her ultrasound was negative for any intrauterine pregnancy and her quantitative hCG was essentially 0.  I think this is just her menses that has occurred it but her cycle is been out of cycle and so this is much more painful but nothing dangerous or life-threatening.  She is discharged in stable condition.  [TR]      ED Course User Index  [TR] Jon Rosado Jr., MD          MDM  Number of Diagnoses or Management Options  Pelvic pain: new and requires workup     Amount and/or Complexity of Data Reviewed  Clinical lab tests: ordered and reviewed  Tests in the radiology section of CPT®: ordered and reviewed    Risk of Complications, Morbidity, and/or Mortality  Presenting problems: moderate  Diagnostic procedures: moderate  Management options: moderate    Patient Progress  Patient progress: stable      Final diagnoses:   Pelvic pain          Jon Rosado Jr., MD  01/23/19 2316

## 2019-01-24 NOTE — ED NOTES
"Pt presents to ED with c/o right lower abdominal pain that has been going on all day. Pt reports that while she was at work she had some vaginal bleeding. Pt describes bleed as \" looks like when your period is about to start\". Pt denies any fall or injury. Pt also denies any dizziness or blurred vision at this time.        Nahum Cisse RN  01/23/19 2126    "

## 2019-08-09 ENCOUNTER — TRANSCRIBE ORDERS (OUTPATIENT)
Dept: ADMINISTRATIVE | Facility: HOSPITAL | Age: 39
End: 2019-08-09

## 2019-08-09 DIAGNOSIS — F41.9 ANXIETY DISORDER, UNSPECIFIED TYPE: ICD-10-CM

## 2019-08-09 DIAGNOSIS — Z12.39 BREAST CANCER SCREENING OTHER THAN MAMMOGRAM: ICD-10-CM

## 2019-08-09 DIAGNOSIS — R73.09 OTHER ABNORMAL GLUCOSE: ICD-10-CM

## 2019-08-09 DIAGNOSIS — E05.90 THYROTOXICOSIS WITHOUT THYROID STORM, UNSPECIFIED THYROTOXICOSIS TYPE: ICD-10-CM

## 2019-08-09 DIAGNOSIS — K58.0 IRRITABLE BOWEL SYNDROME WITH DIARRHEA: ICD-10-CM

## 2019-08-09 DIAGNOSIS — E55.9 VITAMIN D DEFICIENCY, UNSPECIFIED: ICD-10-CM

## 2019-08-09 DIAGNOSIS — N91.2 AMENORRHEA: ICD-10-CM

## 2019-08-09 DIAGNOSIS — M25.561 RIGHT KNEE PAIN, UNSPECIFIED CHRONICITY: Primary | ICD-10-CM

## 2019-08-20 ENCOUNTER — OFFICE VISIT (OUTPATIENT)
Dept: OBGYN | Age: 39
End: 2019-08-20
Payer: MEDICAID

## 2019-08-20 VITALS
BODY MASS INDEX: 25.03 KG/M2 | WEIGHT: 169 LBS | SYSTOLIC BLOOD PRESSURE: 98 MMHG | HEIGHT: 69 IN | DIASTOLIC BLOOD PRESSURE: 62 MMHG

## 2019-08-20 DIAGNOSIS — N92.1 MENOMETRORRHAGIA: ICD-10-CM

## 2019-08-20 DIAGNOSIS — N92.6 IRREGULAR MENSES: ICD-10-CM

## 2019-08-20 DIAGNOSIS — Z00.00 WELLNESS EXAMINATION: ICD-10-CM

## 2019-08-20 DIAGNOSIS — Z76.89 ENCOUNTER TO ESTABLISH CARE: Primary | ICD-10-CM

## 2019-08-20 LAB
BASOPHILS ABSOLUTE: 0.1 K/UL (ref 0–0.2)
BASOPHILS RELATIVE PERCENT: 0.9 % (ref 0–1)
CONTROL: PRESENT
EOSINOPHILS ABSOLUTE: 0.3 K/UL (ref 0–0.6)
EOSINOPHILS RELATIVE PERCENT: 3.9 % (ref 0–5)
FOLLICLE STIMULATING HORMONE: 4 MIU/ML
GONADOTROPIN, CHORIONIC (HCG) QUANT: 0.1 MIU/ML (ref 0–5.3)
HCT VFR BLD CALC: 45.9 % (ref 37–47)
HEMOGLOBIN: 14.7 G/DL (ref 12–16)
IMMATURE GRANULOCYTES #: 0 K/UL
LUTEINIZING HORMONE: 3.9 MIU/ML
LYMPHOCYTES ABSOLUTE: 1.4 K/UL (ref 1.1–4.5)
LYMPHOCYTES RELATIVE PERCENT: 20.4 % (ref 20–40)
MCH RBC QN AUTO: 29.8 PG (ref 27–31)
MCHC RBC AUTO-ENTMCNC: 32 G/DL (ref 33–37)
MCV RBC AUTO: 92.9 FL (ref 81–99)
MONOCYTES ABSOLUTE: 0.7 K/UL (ref 0–0.9)
MONOCYTES RELATIVE PERCENT: 9.7 % (ref 0–10)
NEUTROPHILS ABSOLUTE: 4.6 K/UL (ref 1.5–7.5)
NEUTROPHILS RELATIVE PERCENT: 64.8 % (ref 50–65)
PDW BLD-RTO: 12.5 % (ref 11.5–14.5)
PLATELET # BLD: 263 K/UL (ref 130–400)
PMV BLD AUTO: 10.5 FL (ref 9.4–12.3)
PREGNANCY TEST URINE, POC: NORMAL
RBC # BLD: 4.94 M/UL (ref 4.2–5.4)
TSH SERPL DL<=0.05 MIU/L-ACNC: 0.49 UIU/ML (ref 0.27–4.2)
WBC # BLD: 7 K/UL (ref 4.8–10.8)

## 2019-08-20 PROCEDURE — 81025 URINE PREGNANCY TEST: CPT | Performed by: ADVANCED PRACTICE MIDWIFE

## 2019-08-20 PROCEDURE — 99203 OFFICE O/P NEW LOW 30 MIN: CPT | Performed by: ADVANCED PRACTICE MIDWIFE

## 2019-08-20 RX ORDER — ERGOCALCIFEROL 1.25 MG/1
CAPSULE ORAL
COMMUNITY
Start: 2019-07-29 | End: 2019-10-24 | Stop reason: ALTCHOICE

## 2019-08-20 NOTE — PROGRESS NOTES
[facility]  CNM Office Note    Slime Han is a 34 y.o. female who presents today for her medical conditions/ complaints as noted below. Chief Complaint   Patient presents with   [address]    Menstrual Problem     2 periods in June and no period in July. pt states she is spotting now. Started menses at 10 was very obese > 300 lbs  Spotting today  No July  2 in June 3-4 heavy on 2 nd day changing tampon every 30 minutes  Hx of abnormal pap with colpo normal Hx of HPV, but now normal cysts  Prolapsed bladder       Patient Active Problem List   Diagnosis    Irregular menses       Patient's last menstrual period was 06/29/2019. [id number]    Past Medical History:   Diagnosis Date    Hyperthyroidism     no treatment, checked every 6 months     Past Surgical History:   Procedure Laterality Date    COLONOSCOPY      GALLBLADDER SURGERY      UPPER GASTROINTESTINAL ENDOSCOPY       Family History   Problem Relation Age of Onset    Lung Cancer Father      Social History     Tobacco Use    Smoking status: Current Every Day Smoker     Packs/day: 0.25     Types: Cigarettes    Smokeless tobacco: Never Used    Tobacco comment: weaning   Substance Use Topics    Alcohol use: Not Currently       Current Outpatient Medications   Medication Sig Dispense Refill    vitamin D (ERGOCALCIFEROL) 04941 units CAPS capsule        No current facility-administered medications for this visit. Allergies   Allergen Reactions    Mushroom Extract Complex Swelling     Vitals:    08/20/19 0929   BP: 98/62     Body mass index is 24.96 kg/m². Review of Systems    Physical Exam     Diagnosis Orders   1. Encounter to establish care     2. Irregular menses  POCT urine pregnancy       PLAN:  MEDICATIONS:  No orders of the defined types were placed in this encounter. ORDERS:  Orders Placed This Encounter   Procedures    POCT urine pregnancy       Plan:    ICD-10-CM    1.  Encounter to establish

## 2019-08-24 LAB — PROLACTIN: 5.2 NG/ML (ref 2.8–26)

## 2019-09-06 ENCOUNTER — HOSPITAL ENCOUNTER (OUTPATIENT)
Dept: ULTRASOUND IMAGING | Age: 39
Discharge: HOME | End: 2019-09-06
Payer: MEDICAID

## 2019-09-06 ENCOUNTER — HOSPITAL ENCOUNTER (OUTPATIENT)
Dept: WOMENS IMAGING | Age: 39
Discharge: HOME | End: 2019-09-06
Payer: MEDICAID

## 2019-09-06 DIAGNOSIS — N92.1 MENOMETRORRHAGIA: ICD-10-CM

## 2019-09-06 DIAGNOSIS — Z00.00 WELLNESS EXAMINATION: ICD-10-CM

## 2019-09-06 DIAGNOSIS — N92.6 IRREGULAR MENSES: ICD-10-CM

## 2019-09-06 PROCEDURE — 77063 BREAST TOMOSYNTHESIS BI: CPT

## 2019-09-06 PROCEDURE — 76830 TRANSVAGINAL US NON-OB: CPT

## 2019-09-09 ENCOUNTER — OFFICE VISIT (OUTPATIENT)
Dept: OBGYN | Age: 39
End: 2019-09-09
Payer: MEDICAID

## 2019-09-09 ENCOUNTER — TELEPHONE (OUTPATIENT)
Dept: OBGYN | Age: 39
End: 2019-09-09

## 2019-09-09 VITALS
WEIGHT: 180 LBS | DIASTOLIC BLOOD PRESSURE: 82 MMHG | HEIGHT: 69 IN | BODY MASS INDEX: 26.66 KG/M2 | SYSTOLIC BLOOD PRESSURE: 112 MMHG

## 2019-09-09 DIAGNOSIS — R92.8 ABNORMAL MAMMOGRAM: Primary | ICD-10-CM

## 2019-09-09 DIAGNOSIS — N83.202 LEFT OVARIAN CYST: Primary | ICD-10-CM

## 2019-09-09 DIAGNOSIS — N83.209 HEMORRHAGIC OVARIAN CYST: ICD-10-CM

## 2019-09-09 DIAGNOSIS — N94.89 UTERINE CRAMPING: ICD-10-CM

## 2019-09-09 DIAGNOSIS — N64.52 NIPPLE DISCHARGE: ICD-10-CM

## 2019-09-09 DIAGNOSIS — N92.1 MENOMETRORRHAGIA: ICD-10-CM

## 2019-09-09 PROCEDURE — 99213 OFFICE O/P EST LOW 20 MIN: CPT | Performed by: ADVANCED PRACTICE MIDWIFE

## 2019-09-09 RX ORDER — IBUPROFEN 800 MG/1
800 TABLET ORAL 3 TIMES DAILY PRN
Qty: 90 TABLET | Refills: 0 | Status: SHIPPED | OUTPATIENT
Start: 2019-09-09 | End: 2019-10-24 | Stop reason: ALTCHOICE

## 2019-09-09 ASSESSMENT — ENCOUNTER SYMPTOMS
GASTROINTESTINAL NEGATIVE: 1
ALLERGIC/IMMUNOLOGIC NEGATIVE: 1
RESPIRATORY NEGATIVE: 1
EYES NEGATIVE: 1

## 2019-09-11 LAB — PROLACTIN: 6.1 NG/ML (ref 2.8–26)

## 2019-09-16 ENCOUNTER — OFFICE VISIT (OUTPATIENT)
Dept: OBGYN | Age: 39
End: 2019-09-16
Payer: MEDICAID

## 2019-09-16 ENCOUNTER — HOSPITAL ENCOUNTER (OUTPATIENT)
Dept: WOMENS IMAGING | Age: 39
Discharge: HOME | End: 2019-09-16
Payer: MEDICAID

## 2019-09-16 VITALS
HEART RATE: 52 BPM | WEIGHT: 176 LBS | BODY MASS INDEX: 26.07 KG/M2 | HEIGHT: 69 IN | DIASTOLIC BLOOD PRESSURE: 78 MMHG | SYSTOLIC BLOOD PRESSURE: 112 MMHG

## 2019-09-16 DIAGNOSIS — N92.1 MENOMETRORRHAGIA: ICD-10-CM

## 2019-09-16 DIAGNOSIS — R92.8 ABNORMAL MAMMOGRAM: ICD-10-CM

## 2019-09-16 DIAGNOSIS — R92.8 ABNORMAL MAMMOGRAM: Primary | ICD-10-CM

## 2019-09-16 PROCEDURE — 76642 ULTRASOUND BREAST LIMITED: CPT

## 2019-09-16 PROCEDURE — 58100 BIOPSY OF UTERUS LINING: CPT | Performed by: ADVANCED PRACTICE MIDWIFE

## 2019-09-16 PROCEDURE — 99213 OFFICE O/P EST LOW 20 MIN: CPT | Performed by: ADVANCED PRACTICE MIDWIFE

## 2019-10-04 ENCOUNTER — HOSPITAL ENCOUNTER (OUTPATIENT)
Dept: ULTRASOUND IMAGING | Age: 39
Discharge: HOME | End: 2019-10-04
Payer: MEDICAID

## 2019-10-04 DIAGNOSIS — N83.202 LEFT OVARIAN CYST: ICD-10-CM

## 2019-10-04 PROCEDURE — 76830 TRANSVAGINAL US NON-OB: CPT

## 2019-10-18 DIAGNOSIS — N83.202 BILATERAL OVARIAN CYSTS: Primary | ICD-10-CM

## 2019-10-18 DIAGNOSIS — N83.201 BILATERAL OVARIAN CYSTS: Primary | ICD-10-CM

## 2019-10-24 ENCOUNTER — HOSPITAL ENCOUNTER (EMERGENCY)
Age: 39
Discharge: HOME | End: 2019-10-24
Attending: EMERGENCY MEDICINE
Payer: MEDICAID

## 2019-10-24 ENCOUNTER — APPOINTMENT (OUTPATIENT)
Dept: CT IMAGING | Age: 39
End: 2019-10-24
Payer: MEDICAID

## 2019-10-24 ENCOUNTER — APPOINTMENT (OUTPATIENT)
Dept: GENERAL RADIOLOGY | Age: 39
End: 2019-10-24
Payer: MEDICAID

## 2019-10-24 VITALS
TEMPERATURE: 97.9 F | BODY MASS INDEX: 26.66 KG/M2 | OXYGEN SATURATION: 97 % | RESPIRATION RATE: 18 BRPM | SYSTOLIC BLOOD PRESSURE: 148 MMHG | DIASTOLIC BLOOD PRESSURE: 86 MMHG | HEART RATE: 72 BPM | HEIGHT: 69 IN | WEIGHT: 180 LBS

## 2019-10-24 DIAGNOSIS — S50.01XA CONTUSION OF RIGHT ELBOW, INITIAL ENCOUNTER: ICD-10-CM

## 2019-10-24 DIAGNOSIS — S06.0X0A CONCUSSION WITHOUT LOSS OF CONSCIOUSNESS, INITIAL ENCOUNTER: ICD-10-CM

## 2019-10-24 DIAGNOSIS — S70.01XA CONTUSION OF RIGHT HIP, INITIAL ENCOUNTER: ICD-10-CM

## 2019-10-24 DIAGNOSIS — S09.90XA CLOSED HEAD INJURY, INITIAL ENCOUNTER: Primary | ICD-10-CM

## 2019-10-24 DIAGNOSIS — S01.81XA LACERATION OF FOREHEAD, INITIAL ENCOUNTER: ICD-10-CM

## 2019-10-24 LAB
ALBUMIN SERPL-MCNC: 4.2 G/DL (ref 3.5–5.2)
ALP BLD-CCNC: 46 U/L (ref 35–104)
ALT SERPL-CCNC: 13 U/L (ref 5–33)
AMPHETAMINE SCREEN, URINE: NEGATIVE
ANION GAP SERPL CALCULATED.3IONS-SCNC: 12 MMOL/L (ref 7–19)
APTT: 27.9 SEC (ref 26–36.2)
AST SERPL-CCNC: 15 U/L (ref 5–32)
BACTERIA: ABNORMAL /HPF
BARBITURATE SCREEN URINE: NEGATIVE
BASOPHILS ABSOLUTE: 0.1 K/UL (ref 0–0.2)
BASOPHILS RELATIVE PERCENT: 1 % (ref 0–1)
BENZODIAZEPINE SCREEN, URINE: NEGATIVE
BILIRUB SERPL-MCNC: <0.2 MG/DL (ref 0.2–1.2)
BILIRUBIN URINE: NEGATIVE
BLOOD, URINE: ABNORMAL
BUN BLDV-MCNC: 10 MG/DL (ref 6–20)
CALCIUM SERPL-MCNC: 8.9 MG/DL (ref 8.6–10)
CANNABINOID SCREEN URINE: POSITIVE
CHLORIDE BLD-SCNC: 110 MMOL/L (ref 98–111)
CLARITY: ABNORMAL
CO2: 20 MMOL/L (ref 22–29)
COCAINE METABOLITE SCREEN URINE: NEGATIVE
COLOR: YELLOW
CREAT SERPL-MCNC: 0.7 MG/DL (ref 0.5–0.9)
EOSINOPHILS ABSOLUTE: 0.6 K/UL (ref 0–0.6)
EOSINOPHILS RELATIVE PERCENT: 8 % (ref 0–5)
EPITHELIAL CELLS, UA: 3 /HPF (ref 0–5)
ETHANOL: <10 MG/DL (ref 0–0.08)
GFR NON-AFRICAN AMERICAN: >60
GLUCOSE BLD-MCNC: 113 MG/DL (ref 74–109)
GLUCOSE URINE: NEGATIVE MG/DL
HCG QUALITATIVE: NEGATIVE
HCT VFR BLD CALC: 43.4 % (ref 37–47)
HEMOGLOBIN: 13.8 G/DL (ref 12–16)
HYALINE CASTS: 7 /HPF (ref 0–8)
IMMATURE GRANULOCYTES #: 0 K/UL
INR BLD: 0.98 (ref 0.88–1.18)
KETONES, URINE: NEGATIVE MG/DL
LEUKOCYTE ESTERASE, URINE: NEGATIVE
LYMPHOCYTES ABSOLUTE: 1.9 K/UL (ref 1.1–4.5)
LYMPHOCYTES RELATIVE PERCENT: 23.9 % (ref 20–40)
Lab: ABNORMAL
MCH RBC QN AUTO: 29.7 PG (ref 27–31)
MCHC RBC AUTO-ENTMCNC: 31.8 G/DL (ref 33–37)
MCV RBC AUTO: 93.5 FL (ref 81–99)
MONOCYTES ABSOLUTE: 0.5 K/UL (ref 0–0.9)
MONOCYTES RELATIVE PERCENT: 6.6 % (ref 0–10)
NEUTROPHILS ABSOLUTE: 4.9 K/UL (ref 1.5–7.5)
NEUTROPHILS RELATIVE PERCENT: 60.4 % (ref 50–65)
NITRITE, URINE: NEGATIVE
OPIATE SCREEN URINE: NEGATIVE
PDW BLD-RTO: 12.6 % (ref 11.5–14.5)
PH UA: 5.5 (ref 5–8)
PLATELET # BLD: 259 K/UL (ref 130–400)
PMV BLD AUTO: 10.3 FL (ref 9.4–12.3)
POTASSIUM SERPL-SCNC: 3.7 MMOL/L (ref 3.5–5)
PROTEIN UA: 30 MG/DL
PROTHROMBIN TIME: 12.4 SEC (ref 12–14.6)
RBC # BLD: 4.64 M/UL (ref 4.2–5.4)
RBC UA: 3 /HPF (ref 0–4)
SODIUM BLD-SCNC: 142 MMOL/L (ref 136–145)
SPECIFIC GRAVITY UA: 1.03 (ref 1–1.03)
TOTAL PROTEIN: 6.7 G/DL (ref 6.6–8.7)
URINE REFLEX TO CULTURE: ABNORMAL
UROBILINOGEN, URINE: 0.2 E.U./DL
WBC # BLD: 8 K/UL (ref 4.8–10.8)
WBC UA: 6 /HPF (ref 0–5)

## 2019-10-24 PROCEDURE — 90715 TDAP VACCINE 7 YRS/> IM: CPT | Performed by: EMERGENCY MEDICINE

## 2019-10-24 PROCEDURE — 12051 INTMD RPR FACE/MM 2.5 CM/<: CPT

## 2019-10-24 PROCEDURE — 90471 IMMUNIZATION ADMIN: CPT | Performed by: EMERGENCY MEDICINE

## 2019-10-24 PROCEDURE — 80053 COMPREHEN METABOLIC PANEL: CPT

## 2019-10-24 PROCEDURE — 85610 PROTHROMBIN TIME: CPT

## 2019-10-24 PROCEDURE — 72125 CT NECK SPINE W/O DYE: CPT

## 2019-10-24 PROCEDURE — 70450 CT HEAD/BRAIN W/O DYE: CPT

## 2019-10-24 PROCEDURE — 80307 DRUG TEST PRSMV CHEM ANLYZR: CPT

## 2019-10-24 PROCEDURE — 6360000002 HC RX W HCPCS: Performed by: EMERGENCY MEDICINE

## 2019-10-24 PROCEDURE — 85730 THROMBOPLASTIN TIME PARTIAL: CPT

## 2019-10-24 PROCEDURE — G0480 DRUG TEST DEF 1-7 CLASSES: HCPCS

## 2019-10-24 PROCEDURE — 81001 URINALYSIS AUTO W/SCOPE: CPT

## 2019-10-24 PROCEDURE — 36415 COLL VENOUS BLD VENIPUNCTURE: CPT

## 2019-10-24 PROCEDURE — 84703 CHORIONIC GONADOTROPIN ASSAY: CPT

## 2019-10-24 PROCEDURE — 2500000003 HC RX 250 WO HCPCS: Performed by: EMERGENCY MEDICINE

## 2019-10-24 PROCEDURE — 85025 COMPLETE CBC W/AUTO DIFF WBC: CPT

## 2019-10-24 PROCEDURE — 72170 X-RAY EXAM OF PELVIS: CPT

## 2019-10-24 PROCEDURE — 99285 EMERGENCY DEPT VISIT HI MDM: CPT

## 2019-10-24 RX ORDER — ONDANSETRON 4 MG/1
4 TABLET, ORALLY DISINTEGRATING ORAL EVERY 8 HOURS PRN
Qty: 12 TABLET | Refills: 0 | Status: ON HOLD | OUTPATIENT
Start: 2019-10-24 | End: 2021-10-11 | Stop reason: ALTCHOICE

## 2019-10-24 RX ORDER — ACETAMINOPHEN AND CODEINE PHOSPHATE 300; 30 MG/1; MG/1
1 TABLET ORAL EVERY 4 HOURS PRN
Qty: 18 TABLET | Refills: 0 | Status: SHIPPED | OUTPATIENT
Start: 2019-10-24 | End: 2019-10-27

## 2019-10-24 RX ORDER — LIDOCAINE HYDROCHLORIDE AND EPINEPHRINE 10; 10 MG/ML; UG/ML
20 INJECTION, SOLUTION INFILTRATION; PERINEURAL ONCE
Status: COMPLETED | OUTPATIENT
Start: 2019-10-24 | End: 2019-10-24

## 2019-10-24 RX ADMIN — TETANUS TOXOID, REDUCED DIPHTHERIA TOXOID AND ACELLULAR PERTUSSIS VACCINE, ADSORBED 0.5 ML: 5; 2.5; 8; 8; 2.5 SUSPENSION INTRAMUSCULAR at 05:43

## 2019-10-24 RX ADMIN — LIDOCAINE HYDROCHLORIDE,EPINEPHRINE BITARTRATE 20 ML: 10; .01 INJECTION, SOLUTION INFILTRATION; PERINEURAL at 06:31

## 2019-10-24 ASSESSMENT — PAIN SCALES - GENERAL
PAINLEVEL_OUTOF10: 10
PAINLEVEL_OUTOF10: 0

## 2019-10-24 ASSESSMENT — ENCOUNTER SYMPTOMS
ABDOMINAL DISTENTION: 0
NAUSEA: 0
CONSTIPATION: 0
COLOR CHANGE: 0
DIARRHEA: 0
BACK PAIN: 0
PHOTOPHOBIA: 0
WHEEZING: 0
CHEST TIGHTNESS: 0
SORE THROAT: 0
EYE PAIN: 0
RHINORRHEA: 0
SHORTNESS OF BREATH: 0
VOMITING: 0
ABDOMINAL PAIN: 0
COUGH: 0
TROUBLE SWALLOWING: 0
FACIAL SWELLING: 1

## 2019-10-24 ASSESSMENT — PAIN DESCRIPTION - ORIENTATION: ORIENTATION: RIGHT

## 2019-10-24 ASSESSMENT — PAIN DESCRIPTION - LOCATION: LOCATION: HIP;FACE

## 2019-10-31 ENCOUNTER — HOSPITAL ENCOUNTER (EMERGENCY)
Age: 39
Discharge: HOME | End: 2019-10-31
Payer: MEDICAID

## 2019-10-31 VITALS
BODY MASS INDEX: 26.66 KG/M2 | OXYGEN SATURATION: 98 % | DIASTOLIC BLOOD PRESSURE: 76 MMHG | SYSTOLIC BLOOD PRESSURE: 118 MMHG | HEIGHT: 69 IN | RESPIRATION RATE: 18 BRPM | WEIGHT: 180 LBS | TEMPERATURE: 98.1 F | HEART RATE: 72 BPM

## 2019-10-31 DIAGNOSIS — Z48.02 VISIT FOR SUTURE REMOVAL: Primary | ICD-10-CM

## 2019-10-31 PROCEDURE — 4500000002 HC ER NO CHARGE

## 2019-10-31 ASSESSMENT — ENCOUNTER SYMPTOMS
EYE PAIN: 0
RHINORRHEA: 0
ABDOMINAL DISTENTION: 0
PHOTOPHOBIA: 0
EYE DISCHARGE: 0
ABDOMINAL PAIN: 0
COLOR CHANGE: 0
SORE THROAT: 0
APNEA: 0
BACK PAIN: 0
SHORTNESS OF BREATH: 0
COUGH: 0
NAUSEA: 0

## 2019-12-20 ENCOUNTER — APPOINTMENT (OUTPATIENT)
Dept: GENERAL RADIOLOGY | Facility: HOSPITAL | Age: 39
End: 2019-12-20

## 2019-12-20 ENCOUNTER — HOSPITAL ENCOUNTER (EMERGENCY)
Facility: HOSPITAL | Age: 39
Discharge: HOME | End: 2019-12-20
Admitting: EMERGENCY MEDICINE

## 2019-12-20 VITALS
BODY MASS INDEX: 27.49 KG/M2 | DIASTOLIC BLOOD PRESSURE: 73 MMHG | OXYGEN SATURATION: 99 % | RESPIRATION RATE: 16 BRPM | SYSTOLIC BLOOD PRESSURE: 119 MMHG | WEIGHT: 192 LBS | HEIGHT: 70 IN | HEART RATE: 61 BPM | TEMPERATURE: 98 F

## 2019-12-20 DIAGNOSIS — M25.511 ACUTE PAIN OF RIGHT SHOULDER: Primary | ICD-10-CM

## 2019-12-20 LAB
B-HCG UR QL: NEGATIVE
INTERNAL NEGATIVE CONTROL: NEGATIVE
INTERNAL POSITIVE CONTROL: POSITIVE
Lab: NORMAL

## 2019-12-20 PROCEDURE — 73030 X-RAY EXAM OF SHOULDER: CPT

## 2019-12-20 PROCEDURE — 25010000002 KETOROLAC TROMETHAMINE PER 15 MG: Performed by: NURSE PRACTITIONER

## 2019-12-20 PROCEDURE — 96372 THER/PROPH/DIAG INJ SC/IM: CPT

## 2019-12-20 PROCEDURE — 81025 URINE PREGNANCY TEST: CPT | Performed by: NURSE PRACTITIONER

## 2019-12-20 PROCEDURE — 25010000002 ORPHENADRINE CITRATE PER 60 MG: Performed by: NURSE PRACTITIONER

## 2019-12-20 PROCEDURE — 99283 EMERGENCY DEPT VISIT LOW MDM: CPT

## 2019-12-20 RX ORDER — ORPHENADRINE CITRATE 30 MG/ML
60 INJECTION INTRAMUSCULAR; INTRAVENOUS ONCE
Status: COMPLETED | OUTPATIENT
Start: 2019-12-20 | End: 2019-12-20

## 2019-12-20 RX ORDER — CYCLOBENZAPRINE HCL 10 MG
10 TABLET ORAL NIGHTLY
Qty: 7 TABLET | Refills: 0 | Status: SHIPPED | OUTPATIENT
Start: 2019-12-20 | End: 2019-12-27

## 2019-12-20 RX ORDER — KETOROLAC TROMETHAMINE 30 MG/ML
60 INJECTION, SOLUTION INTRAMUSCULAR; INTRAVENOUS ONCE
Status: COMPLETED | OUTPATIENT
Start: 2019-12-20 | End: 2019-12-20

## 2019-12-20 RX ORDER — NAPROXEN 500 MG/1
500 TABLET ORAL 2 TIMES DAILY WITH MEALS
Qty: 20 TABLET | Refills: 0 | Status: SHIPPED | OUTPATIENT
Start: 2019-12-20 | End: 2019-12-30

## 2019-12-20 RX ADMIN — KETOROLAC TROMETHAMINE 60 MG: 30 INJECTION, SOLUTION INTRAMUSCULAR at 15:44

## 2019-12-20 RX ADMIN — ORPHENADRINE CITRATE 60 MG: 60 INJECTION INTRAMUSCULAR; INTRAVENOUS at 15:44
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
## 2019-12-21 NOTE — DISCHARGE INSTRUCTIONS
Increase fluids.  Medication as ordered.  Follow up with PCP Monday - call for appointment. Return to ED if condition does not improve or worsens

## 2020-01-22 PROBLEM — M75.01 ADHESIVE CAPSULITIS OF RIGHT SHOULDER: Status: ACTIVE | Noted: 2020-01-22

## 2020-01-22 NOTE — OP NOTE
adduction: 40  3) External rotation in abduction:  90  4) Internal rotation in abduction:  80     After successfully manipulating the shoulder, the coracoid process was palpated and a sterile prep was performed just lateral to it. Then, 4 mL 1% lidocaine, 4 mL 0.25% Marcaine, and 80 mg of Depo-Medrol were injected successfully in to the glenohumeral joint with a 22 gauge needle. A band-aid was applied. The patient was awakened by anesthesia, placed in a sling, and transported to the preop area in stable condition. POSTOP PLAN  Begin range of motion immediately.   A physical therapy prescription has been given for passive and active-assist range of motion to begin on postop day No. 1.  The patient will follow up in clinic in 1 week for a clinical check.     Electronically signed by Hayden Vera MD on 1/23/2020 at 24 [phone]

## 2020-01-23 ENCOUNTER — ANESTHESIA EVENT (OUTPATIENT)
Dept: OPERATING ROOM | Age: 40
End: 2020-01-23
Payer: MEDICAID

## 2020-01-23 ENCOUNTER — HOSPITAL ENCOUNTER (OUTPATIENT)
Age: 40
Setting detail: OUTPATIENT SURGERY
Discharge: HOME | End: 2020-01-23
Attending: ORTHOPAEDIC SURGERY | Admitting: ORTHOPAEDIC SURGERY
Payer: MEDICAID

## 2020-01-23 ENCOUNTER — ANESTHESIA (OUTPATIENT)
Dept: OPERATING ROOM | Age: 40
End: 2020-01-23
Payer: MEDICAID

## 2020-01-23 VITALS
OXYGEN SATURATION: 98 % | RESPIRATION RATE: 18 BRPM | HEART RATE: 58 BPM | TEMPERATURE: 98.2 F | BODY MASS INDEX: 26.63 KG/M2 | SYSTOLIC BLOOD PRESSURE: 116 MMHG | DIASTOLIC BLOOD PRESSURE: 72 MMHG | HEIGHT: 70 IN | WEIGHT: 186 LBS

## 2020-01-23 VITALS
DIASTOLIC BLOOD PRESSURE: 70 MMHG | OXYGEN SATURATION: 95 % | RESPIRATION RATE: 13 BRPM | SYSTOLIC BLOOD PRESSURE: 99 MMHG

## 2020-01-23 LAB — HCG(URINE) PREGNANCY TEST: NEGATIVE

## 2020-01-23 PROCEDURE — 7100000011 HC PHASE II RECOVERY - ADDTL 15 MIN: Performed by: ORTHOPAEDIC SURGERY

## 2020-01-23 PROCEDURE — 7100000010 HC PHASE II RECOVERY - FIRST 15 MIN: Performed by: ORTHOPAEDIC SURGERY

## 2020-01-23 PROCEDURE — 2709999900 HC NON-CHARGEABLE SUPPLY: Performed by: ORTHOPAEDIC SURGERY

## 2020-01-23 PROCEDURE — 3700000000 HC ANESTHESIA ATTENDED CARE: Performed by: ORTHOPAEDIC SURGERY

## 2020-01-23 PROCEDURE — 7100000000 HC PACU RECOVERY - FIRST 15 MIN: Performed by: ORTHOPAEDIC SURGERY

## 2020-01-23 PROCEDURE — 6360000002 HC RX W HCPCS: Performed by: ANESTHESIOLOGY

## 2020-01-23 PROCEDURE — 2500000003 HC RX 250 WO HCPCS: Performed by: NURSE ANESTHETIST, CERTIFIED REGISTERED

## 2020-01-23 PROCEDURE — 6360000002 HC RX W HCPCS: Performed by: NURSE ANESTHETIST, CERTIFIED REGISTERED

## 2020-01-23 PROCEDURE — 2580000003 HC RX 258: Performed by: ANESTHESIOLOGY

## 2020-01-23 PROCEDURE — 64415 NJX AA&/STRD BRCH PLXS IMG: CPT | Performed by: NURSE ANESTHETIST, CERTIFIED REGISTERED

## 2020-01-23 PROCEDURE — 3600000101 HC MANIP SHOULDER UNDER ANESTHESI: Performed by: ORTHOPAEDIC SURGERY

## 2020-01-23 PROCEDURE — 6360000002 HC RX W HCPCS: Performed by: ORTHOPAEDIC SURGERY

## 2020-01-23 PROCEDURE — 7100000001 HC PACU RECOVERY - ADDTL 15 MIN: Performed by: ORTHOPAEDIC SURGERY

## 2020-01-23 PROCEDURE — 2500000003 HC RX 250 WO HCPCS: Performed by: ORTHOPAEDIC SURGERY

## 2020-01-23 PROCEDURE — 84703 CHORIONIC GONADOTROPIN ASSAY: CPT

## 2020-01-23 RX ORDER — PROMETHAZINE HYDROCHLORIDE 25 MG/ML
6.25 INJECTION, SOLUTION INTRAMUSCULAR; INTRAVENOUS
Status: DISCONTINUED | OUTPATIENT
Start: 2020-01-23 | End: 2020-01-23 | Stop reason: HOSPADM

## 2020-01-23 RX ORDER — OXYCODONE HYDROCHLORIDE AND ACETAMINOPHEN 5; 325 MG/1; MG/1
2 TABLET ORAL PRN
Status: DISCONTINUED | OUTPATIENT
Start: 2020-01-23 | End: 2020-01-23 | Stop reason: HOSPADM

## 2020-01-23 RX ORDER — LIDOCAINE HYDROCHLORIDE 10 MG/ML
INJECTION, SOLUTION EPIDURAL; INFILTRATION; INTRACAUDAL; PERINEURAL PRN
Status: DISCONTINUED | OUTPATIENT
Start: 2020-01-23 | End: 2020-01-23 | Stop reason: SDUPTHER

## 2020-01-23 RX ORDER — SODIUM CHLORIDE, SODIUM LACTATE, POTASSIUM CHLORIDE, CALCIUM CHLORIDE 600; 310; 30; 20 MG/100ML; MG/100ML; MG/100ML; MG/100ML
INJECTION, SOLUTION INTRAVENOUS CONTINUOUS
Status: DISCONTINUED | OUTPATIENT
Start: 2020-01-23 | End: 2020-01-23 | Stop reason: HOSPADM

## 2020-01-23 RX ORDER — MIDAZOLAM HYDROCHLORIDE 1 MG/ML
2 INJECTION INTRAMUSCULAR; INTRAVENOUS
Status: COMPLETED | OUTPATIENT
Start: 2020-01-23 | End: 2020-01-23

## 2020-01-23 RX ORDER — MEPERIDINE HYDROCHLORIDE 50 MG/ML
12.5 INJECTION INTRAMUSCULAR; INTRAVENOUS; SUBCUTANEOUS EVERY 5 MIN PRN
Status: DISCONTINUED | OUTPATIENT
Start: 2020-01-23 | End: 2020-01-23 | Stop reason: HOSPADM

## 2020-01-23 RX ORDER — LIDOCAINE HYDROCHLORIDE 10 MG/ML
1 INJECTION, SOLUTION EPIDURAL; INFILTRATION; INTRACAUDAL; PERINEURAL
Status: DISCONTINUED | OUTPATIENT
Start: 2020-01-23 | End: 2020-01-23 | Stop reason: HOSPADM

## 2020-01-23 RX ORDER — HYDRALAZINE HYDROCHLORIDE 20 MG/ML
5 INJECTION INTRAMUSCULAR; INTRAVENOUS EVERY 10 MIN PRN
Status: DISCONTINUED | OUTPATIENT
Start: 2020-01-23 | End: 2020-01-23 | Stop reason: HOSPADM

## 2020-01-23 RX ORDER — MORPHINE SULFATE 4 MG/ML
4 INJECTION, SOLUTION INTRAMUSCULAR; INTRAVENOUS
Status: DISCONTINUED | OUTPATIENT
Start: 2020-01-23 | End: 2020-01-23 | Stop reason: HOSPADM

## 2020-01-23 RX ORDER — OXYCODONE HYDROCHLORIDE AND ACETAMINOPHEN 5; 325 MG/1; MG/1
1 TABLET ORAL PRN
Status: DISCONTINUED | OUTPATIENT
Start: 2020-01-23 | End: 2020-01-23 | Stop reason: HOSPADM

## 2020-01-23 RX ORDER — PROPOFOL 10 MG/ML
INJECTION, EMULSION INTRAVENOUS PRN
Status: DISCONTINUED | OUTPATIENT
Start: 2020-01-23 | End: 2020-01-23 | Stop reason: SDUPTHER

## 2020-01-23 RX ORDER — MORPHINE SULFATE 4 MG/ML
4 INJECTION, SOLUTION INTRAMUSCULAR; INTRAVENOUS EVERY 5 MIN PRN
Status: DISCONTINUED | OUTPATIENT
Start: 2020-01-23 | End: 2020-01-23 | Stop reason: HOSPADM

## 2020-01-23 RX ORDER — LABETALOL 20 MG/4 ML (5 MG/ML) INTRAVENOUS SYRINGE
5 EVERY 10 MIN PRN
Status: DISCONTINUED | OUTPATIENT
Start: 2020-01-23 | End: 2020-01-23 | Stop reason: HOSPADM

## 2020-01-23 RX ORDER — DIPHENHYDRAMINE HYDROCHLORIDE 50 MG/ML
12.5 INJECTION INTRAMUSCULAR; INTRAVENOUS
Status: DISCONTINUED | OUTPATIENT
Start: 2020-01-23 | End: 2020-01-23 | Stop reason: HOSPADM

## 2020-01-23 RX ORDER — ROPIVACAINE HYDROCHLORIDE 5 MG/ML
INJECTION, SOLUTION EPIDURAL; INFILTRATION; PERINEURAL
Status: COMPLETED | OUTPATIENT
Start: 2020-01-23 | End: 2020-01-23

## 2020-01-23 RX ORDER — SODIUM CHLORIDE 0.9 % (FLUSH) 0.9 %
10 SYRINGE (ML) INJECTION EVERY 12 HOURS SCHEDULED
Status: DISCONTINUED | OUTPATIENT
Start: 2020-01-23 | End: 2020-01-23 | Stop reason: HOSPADM

## 2020-01-23 RX ORDER — SCOLOPAMINE TRANSDERMAL SYSTEM 1 MG/1
1 PATCH, EXTENDED RELEASE TRANSDERMAL ONCE
Status: DISCONTINUED | OUTPATIENT
Start: 2020-01-23 | End: 2020-01-23 | Stop reason: HOSPADM

## 2020-01-23 RX ORDER — HYDROCODONE BITARTRATE AND ACETAMINOPHEN 5; 325 MG/1; MG/1
1 TABLET ORAL EVERY 4 HOURS PRN
Qty: 12 TABLET | Refills: 0 | Status: SHIPPED | OUTPATIENT
Start: 2020-01-23 | End: 2020-01-30

## 2020-01-23 RX ORDER — FENTANYL CITRATE 50 UG/ML
50 INJECTION, SOLUTION INTRAMUSCULAR; INTRAVENOUS
Status: DISCONTINUED | OUTPATIENT
Start: 2020-01-23 | End: 2020-01-23 | Stop reason: HOSPADM

## 2020-01-23 RX ORDER — METOCLOPRAMIDE HYDROCHLORIDE 5 MG/ML
10 INJECTION INTRAMUSCULAR; INTRAVENOUS
Status: DISCONTINUED | OUTPATIENT
Start: 2020-01-23 | End: 2020-01-23 | Stop reason: HOSPADM

## 2020-01-23 RX ORDER — MORPHINE SULFATE 4 MG/ML
2 INJECTION, SOLUTION INTRAMUSCULAR; INTRAVENOUS EVERY 5 MIN PRN
Status: DISCONTINUED | OUTPATIENT
Start: 2020-01-23 | End: 2020-01-23 | Stop reason: HOSPADM

## 2020-01-23 RX ORDER — SODIUM CHLORIDE 0.9 % (FLUSH) 0.9 %
10 SYRINGE (ML) INJECTION PRN
Status: DISCONTINUED | OUTPATIENT
Start: 2020-01-23 | End: 2020-01-23 | Stop reason: HOSPADM

## 2020-01-23 RX ORDER — MIDAZOLAM HYDROCHLORIDE 1 MG/ML
INJECTION INTRAMUSCULAR; INTRAVENOUS
Status: DISCONTINUED
Start: 2020-01-23 | End: 2020-01-23 | Stop reason: HOSPADM

## 2020-01-23 RX ADMIN — ROPIVACAINE HYDROCHLORIDE 20 ML: 5 INJECTION, SOLUTION EPIDURAL; INFILTRATION; PERINEURAL at 10:12

## 2020-01-23 RX ADMIN — PROPOFOL 50 MG: 10 INJECTION, EMULSION INTRAVENOUS at 10:52

## 2020-01-23 RX ADMIN — MIDAZOLAM 2 MG: 1 INJECTION INTRAMUSCULAR; INTRAVENOUS at 10:08

## 2020-01-23 RX ADMIN — LIDOCAINE HYDROCHLORIDE 5 ML: 10 INJECTION, SOLUTION EPIDURAL; INFILTRATION; INTRACAUDAL; PERINEURAL at 10:52

## 2020-01-23 RX ADMIN — SODIUM CHLORIDE, SODIUM LACTATE, POTASSIUM CHLORIDE, AND CALCIUM CHLORIDE: 600; 310; 30; 20 INJECTION, SOLUTION INTRAVENOUS at 09:23

## 2020-01-23 ASSESSMENT — PAIN SCALES - GENERAL
PAINLEVEL_OUTOF10: 0

## 2020-01-23 ASSESSMENT — ENCOUNTER SYMPTOMS: SHORTNESS OF BREATH: 0

## 2020-01-23 ASSESSMENT — LIFESTYLE VARIABLES: SMOKING_STATUS: 1

## 2020-01-23 NOTE — ANESTHESIA PRE PROCEDURE
BP Readings from Last 3 Encounters:   10/31/19 118/76   10/24/19 (!) 148/86   09/16/19 112/78       NPO Status:                                                                                 BMI:   Wt Readings from Last 3 Encounters:   01/21/20 186 lb (84.4 kg)   10/31/19 180 lb (81.6 kg)   10/24/19 180 lb (81.6 kg)     Body mass index is 27.07 kg/m². CBC:   Lab Results   Component Value Date    WBC 8.0 10/24/2019    RBC 4.64 10/24/2019    HGB 13.8 10/24/2019    HCT 43.4 10/24/2019    MCV 93.5 10/24/2019    RDW 12.6 10/24/2019     10/24/2019       CMP:   Lab Results   Component Value Date     10/24/2019    K 3.7 10/24/2019     10/24/2019    CO2 20 10/24/2019    BUN 10 10/24/2019    CREATININE 0.7 10/24/2019    LABGLOM >60 10/24/2019    GLUCOSE 113 10/24/2019    PROT 6.7 10/24/2019    CALCIUM 8.9 10/24/2019    BILITOT <0.2 10/24/2019    ALKPHOS 46 10/24/2019    AST 15 10/24/2019    ALT 13 10/24/2019       POC Tests: No results for input(s): POCGLU, POCNA, POCK, POCCL, POCBUN, POCHEMO, POCHCT in the last 72 hours. Coags:   Lab Results   Component Value Date    PROTIME 12.4 10/24/2019    INR 0.98 10/24/2019    APTT 27.9 10/24/2019       HCG (If Applicable):   Lab Results   Component Value Date    PREGTESTUR neg 08/20/2019        ABGs: No results found for: PHART, PO2ART, XYD5BIV, CQX8KQO, BEART, W1IXFSQI     Type & Screen (If Applicable):  No results found for: LABABO, [address]    Anesthesia Evaluation  Patient summary reviewed and Nursing notes reviewed no history of anesthetic complications:   Airway: Mallampati: II  TM distance: >3 FB   Neck ROM: full  Mouth opening: > = 3 FB Dental: normal exam   (+) upper dentures      Pulmonary:Negative Pulmonary ROS and normal exam  breath sounds clear to auscultation  (+) current smoker    (-) shortness of breath          Patient smoked on day of surgery.                  Cardiovascular:        (-) hypertension, CAD, angina and  CHF    NYHA Classification: I  ECG reviewed  Rhythm: regular  Rate: normal           Beta Blocker:  Not on Beta Blocker         Neuro/Psych:   Negative Neuro/Psych ROS     (-) seizures, CVA and depression/anxiety            GI/Hepatic/Renal: Neg GI/Hepatic/Renal ROS       (-) hiatal hernia and GERD       Endo/Other: Negative Endo/Other ROS   (+) hyperthyroidism: arthritis: OA., . Pt had PAT visit. Abdominal:       Abdomen: soft. Vascular:                                        Anesthesia Plan      general and regional     ASA 2     (Iv zofran within 30 min of closing )  Induction: intravenous. BIS  MIPS: Postoperative opioids intended and Prophylactic antiemetics administered. Anesthetic plan and risks discussed with patient. Use of blood products discussed with patient whom. Plan discussed with CRNA.     Attending anesthesiologist reviewed and agrees with Pre Eval content              Yossi Moreno MD   1/23/2020

## 2020-01-23 NOTE — ANESTHESIA PROCEDURE NOTES
Peripheral Block    Patient location during procedure: holding area  Start time: 1/23/2020 10:13 AM  End time: 1/23/2020 10:13 AM  Staffing  Anesthesiologist: Wiliam Gil MD  Performed: anesthesiologist   Preanesthetic Checklist  Completed: patient identified, site marked, surgical consent, pre-op evaluation, timeout performed, IV checked, risks and benefits discussed, monitors and equipment checked, anesthesia consent given, oxygen available and patient being monitored  Peripheral Block  Patient position: supine  Prep: ChloraPrep  Patient monitoring: cardiac monitor, continuous pulse ox, frequent blood pressure checks and IV access  Block type: Brachial plexus  Laterality: right  Injection technique: single-shot  Procedures: ultrasound guided  Interscalene  Provider prep: sterile gloves and mask  Needle  Needle type: short-bevel   Needle gauge: 22 G  Needle length: 5 cm  Needle localization: ultrasound guidance  Test dose: negative  Assessment  Injection assessment: negative aspiration for heme, no paresthesia on injection and local visualized surrounding nerve on ultrasound  Slow fractionated injection: yes  Hemodynamics: stable  Additional Notes  Needle was introduced aprroximately the C5-C6 region and using a inplane imaging technique the needle was directed thru the middle scalene muscle and brought to bear adjacent to the brachial plexus. Needle advancement was under direct vision at all times . Local Anesthesia was injected and all vascular structures were avoided. The local anesthetic hydrodissected in a perineural fashion. Ropivicaine 0.5% injected in divided doses, total of 20 cc injected. The plexus appeared anatomically normal and no obvious pathology was noted.    Reason for block: post-op pain management

## 2020-01-23 NOTE — H&P
Pt Name: Lisa Coto  MRN: [id number]  YOB: 1980  Date of evaluation: 1/23/2020    H&P including current review of systems was updated in the paper chart and/or the document previously scanned into the record. There have been no significant changes or new problems since the original evaluation. The patient's problems continue and indications for contemplated procedure have not changed.     Electronically signed by Belen Cleary MD on 1/23/2020 at 10:47 AM

## 2021-09-20 ENCOUNTER — HOSPITAL ENCOUNTER (OUTPATIENT)
Dept: WOMENS IMAGING | Age: 41
Discharge: HOME | End: 2021-09-20
Payer: MEDICAID

## 2021-09-20 DIAGNOSIS — N63.20 LEFT BREAST MASS: ICD-10-CM

## 2021-09-20 DIAGNOSIS — N64.52 NIPPLE DISCHARGE: ICD-10-CM

## 2021-09-20 DIAGNOSIS — Z12.31 OTHER SCREENING MAMMOGRAM: ICD-10-CM

## 2021-09-20 PROCEDURE — 77066 DX MAMMO INCL CAD BI: CPT

## 2021-09-20 PROCEDURE — 76642 ULTRASOUND BREAST LIMITED: CPT

## 2021-09-28 ENCOUNTER — OFFICE VISIT (OUTPATIENT)
Dept: SURGERY | Age: 41
End: 2021-09-28
Payer: MEDICAID

## 2021-09-28 VITALS
HEIGHT: 70 IN | WEIGHT: 196.4 LBS | BODY MASS INDEX: 28.12 KG/M2 | TEMPERATURE: 98.6 F | SYSTOLIC BLOOD PRESSURE: 114 MMHG | DIASTOLIC BLOOD PRESSURE: 70 MMHG

## 2021-09-28 DIAGNOSIS — N63.20 LEFT BREAST MASS: ICD-10-CM

## 2021-09-28 PROCEDURE — 99204 OFFICE O/P NEW MOD 45 MIN: CPT | Performed by: SURGERY

## 2021-09-28 PROCEDURE — G8419 CALC BMI OUT NRM PARAM NOF/U: HCPCS | Performed by: SURGERY

## 2021-09-28 PROCEDURE — G8427 DOCREV CUR MEDS BY ELIG CLIN: HCPCS | Performed by: SURGERY

## 2021-09-28 PROCEDURE — 4004F PT TOBACCO SCREEN RCVD TLK: CPT | Performed by: SURGERY

## 2021-09-28 ASSESSMENT — ENCOUNTER SYMPTOMS
CONSTIPATION: 0
SORE THROAT: 0
SHORTNESS OF BREATH: 0
DIARRHEA: 0
COUGH: 1
NAUSEA: 0
ABDOMINAL PAIN: 0
EYE REDNESS: 0
COLOR CHANGE: 0
VOMITING: 0
ABDOMINAL DISTENTION: 0
CHEST TIGHTNESS: 0
BACK PAIN: 0
EYE PAIN: 0

## 2021-09-28 NOTE — LETTER
SCHEDULING INSTRUCTIONS:     Patient: Gianna Lopez  : 1980    Hospital[facility] Hospital    Admitting Physician:  Dr. Joanne Wheatley    Diagnosis: Left Breast Mass    Procedure: Excision Left Breast Biopsy and Possible Duct Excision    ALLOW ADDITIONAL 30 MINS FOR TURNOVER EXCEPT FOR PHYSICIAN'S BOUNCE DAY    Anesthesia: SED/MAC    Admission:Outpatient     Date:           Elly Burks, DO       NOT TO BE USED OUTSIDE OF THE OFFICE

## 2021-09-28 NOTE — PROGRESS NOTES
SUBJECTIVE:  Ms. Zoë Alcaraz is a 35 y.o. female who presents today with an abnormal mammogram. She states that a few years ago she was found to have a cyst and it was monitored. She notes pain in her left breast more than her right. She drinks a large amount of caffeine. She states that three weeks ago there was discharge at the nipple that was green/yellow with blood. This has not occurred since. She does not try to manually express it because it is painful. She started her menses at 14years old. She is currently still having menses . She has had 5 pregnancies with first live birth at 19years of age. She does not have a history of breast cancer in her family. She does smoke, but states she is trying to quit. Past Medical History:   Diagnosis Date    Hyperthyroidism     no treatment, checked every 6 months     Past Surgical History:   Procedure Laterality Date    CLOSED MANIPULATION SHOULDER Right 1/23/2020    RIGHT SHOULDER MANIPULATION UNDE ANESTHESIA performed by Kevan Amaya MD at [address] [place] [address]      GALLBLADDER SURGERY      HC INJECT OTHER PERPHRL NERV Right 1/23/2020    CORTICOSTEROID INJECTION performed by Kevan Amaya MD at [facility] OR    UPPER GASTROINTESTINAL ENDOSCOPY       Current Outpatient Medications   Medication Sig Dispense Refill    ondansetron (ZOFRAN ODT) 4 MG disintegrating tablet Take 1 tablet by mouth every 8 hours as needed for Nausea or Vomiting (Patient not taking: Reported on 9/28/2021) 12 tablet 0     No current facility-administered medications for this visit.      Allergies: Mushroom extract complex    Family History   Problem Relation Age of Onset    Lung Cancer Father     Cancer Father         lung cancer       Social History     Tobacco Use    Smoking status: Current Every Day Smoker     Packs/day: 0.25     Types: Cigarettes    Smokeless tobacco: Never Used    Tobacco comment: weaning   Substance Use Topics    Alcohol use: Not Currently Review of Systems   Constitutional: Negative for fatigue, fever and unexpected weight change. HENT: Negative for hearing loss, nosebleeds and sore throat. Eyes: Negative for pain, redness and visual disturbance. Respiratory: Positive for cough. Negative for chest tightness and shortness of breath. Cardiovascular: Negative for chest pain, palpitations and leg swelling. Gastrointestinal: Negative for abdominal distention, abdominal pain, constipation, diarrhea, nausea and vomiting. Endocrine: Negative for cold intolerance, heat intolerance and polydipsia. Genitourinary: Negative for difficulty urinating, frequency and urgency. Musculoskeletal: Positive for arthralgias and neck pain. Negative for back pain and joint swelling. Skin: Negative for color change, rash and wound. Allergic/Immunologic: Negative for environmental allergies and food allergies. Neurological: Negative for seizures, light-headedness and headaches. Hematological: Negative for adenopathy. Does not bruise/bleed easily. Psychiatric/Behavioral: Negative for confusion, sleep disturbance and suicidal ideas. OBJECTIVE:  /70 (Site: Right Upper Arm, Position: Sitting, Cuff Size: Medium Adult)   Temp 98.6 °F (37 °C) (Temporal)   Ht 5' 10\" (1.778 m)   Wt 196 lb 6.4 oz (89.1 kg)   BMI 28.18 kg/m²   Physical Exam  Vitals reviewed. Constitutional:       Appearance: She is well-developed. HENT:      Head: Normocephalic and atraumatic. Eyes:      Pupils: Pupils are equal, round, and reactive to light. Cardiovascular:      Rate and Rhythm: Normal rate and regular rhythm. Pulmonary:      Effort: Pulmonary effort is normal.      Breath sounds: Normal breath sounds. No wheezing or rales. Chest:      Breasts:         Right: Normal. No inverted nipple, mass, nipple discharge, skin change or tenderness. Left: Mass present. No inverted nipple, nipple discharge, skin change or tenderness. Comments: Mobile tender mass in the superior central portion just beneath the areola, and in the left upper outer breast.   Abdominal:      General: Bowel sounds are normal. There is no distension. Palpations: Abdomen is soft. Musculoskeletal:         General: Normal range of motion. Cervical back: Normal range of motion and neck supple. Lymphadenopathy:      Cervical: No cervical adenopathy. Upper Body:      Right upper body: No supraclavicular or axillary adenopathy. Left upper body: No supraclavicular or axillary adenopathy. Skin:     General: Skin is warm and dry. Neurological:      Mental Status: She is alert and oriented to person, place, and time. Psychiatric:         Behavior: Behavior normal.         Thought Content: Thought content normal.         Judgment: Judgment normal.         9/16/2019 US Left Breast       Impression   1. Probably benign left breast mass consistent with hamartoma. Recommend follow-up left diagnostic mammogram in 6 months, which will   be 6 months of follow-up. Results and recommendations discussed with the patient person at the   conclusion of the exam.    BI-RADS CATEGORY 3: PROBABLY BENIGN FINDING. Management Recommendation: Short interval follow-up or continued   surveillance mammography. Recommend left diagnostic mammogram in 6   months, which will be 6 months of follow-up. Signed by Dr Clara Fontaine on 9/16/2019 3:28 PM         9/20/2021 Diagnostic Mammogram Left      Impression   1. Ultrasonographic mass within the nipple with bloody nipple   discharge. Recommend surgical consult for consideration of excision. 2. No mammographic evidence of malignancy in the right breast.   3. Breast density notification will be sent to the patient. Results and recommendations discussed with the patient in person at   the conclusion of the exam. Our office will perform notification and   assist in scheduling per protocol.    BI-RADS CATEGORY 4: SUSPICIOUS ABNORMALITY. BIOPSY SHOULD BE   CONSIDERED. Management Recommendation: Biopsy should be performed in the absence   of clinical contraindication. Signed by Dr Guzman Hong           ASSESSMENT:   Diagnosis Orders   1. Left breast mass  US BREAST LIMITED LEFT       PLAN:  Orders Placed This Encounter   Procedures    US BREAST LIMITED LEFT     Standing Status:   Future     Standing Expiration Date:   9/28/2022     Order Specific Question:   Reason for exam:     Answer:   marking for surgery 10/11       The risks, benefits, and alternatives were discussed with the pt. She is willing to accept the risks and proceed with a left breast mass excisional biopsy with possible duct excision. She will have a preoperative ultrasound to cinthya the location of the lesion. The surgical risks included but not limited to bleeding, infection, recurrence, risk of needing further surgery, etc. The anesthetic risks included heart attacks, strokes, pneumonia, phlebitis, etc.  She is willing to accept all risks and proceed with surgery. No guarantees have been given. Return in about 1 month (around 10/28/2021) for Post-operative Visit.     DO Kvng 9/43/9479 1:45 PM

## 2021-09-29 RX ORDER — SODIUM CHLORIDE 0.9 % (FLUSH) 0.9 %
10 SYRINGE (ML) INJECTION PRN
Status: CANCELLED | OUTPATIENT
Start: 2021-09-29

## 2021-09-29 RX ORDER — SODIUM CHLORIDE, SODIUM LACTATE, POTASSIUM CHLORIDE, CALCIUM CHLORIDE 600; 310; 30; 20 MG/100ML; MG/100ML; MG/100ML; MG/100ML
INJECTION, SOLUTION INTRAVENOUS CONTINUOUS
Status: CANCELLED | OUTPATIENT
Start: 2021-09-29

## 2021-09-29 RX ORDER — SODIUM CHLORIDE 9 MG/ML
25 INJECTION, SOLUTION INTRAVENOUS PRN
Status: CANCELLED | OUTPATIENT
Start: 2021-09-29

## 2021-09-29 RX ORDER — SODIUM CHLORIDE 0.9 % (FLUSH) 0.9 %
10 SYRINGE (ML) INJECTION EVERY 12 HOURS SCHEDULED
Status: CANCELLED | OUTPATIENT
Start: 2021-09-29

## 2021-09-29 NOTE — H&P
[facility] Surgery History and Physical   SUBJECTIVE:  Ms. Trinidad Mabry is a 35 y.o. female who presents today with an abnormal mammogram. She states that a few years ago she was found to have a cyst and it was monitored. She notes pain in her left breast more than her right. She drinks a large amount of caffeine. She states that three weeks ago there was discharge at the nipple that was green/yellow with blood. This has not occurred since. She does not try to manually express it because it is painful. She started her menses at 14years old. She is currently still having menses . She has had 5 pregnancies with first live birth at 19years of age. She does not have a history of breast cancer in her family. She does smoke, but states she is trying to quit. Past Medical History:   Diagnosis Date    Hyperthyroidism     no treatment, checked every 6 months     Past Surgical History:   Procedure Laterality Date    CLOSED MANIPULATION SHOULDER Right 1/23/2020    RIGHT SHOULDER MANIPULATION UNDE ANESTHESIA performed by Sandra Ny MD at [address] [place] [address]      GALLBLADDER SURGERY      HC INJECT OTHER PERPHRL NERV Right 1/23/2020    CORTICOSTEROID INJECTION performed by Sandra Ny MD at [address] [place] [address] OR    UPPER GASTROINTESTINAL ENDOSCOPY       Current Outpatient Medications   Medication Sig Dispense Refill    ondansetron (ZOFRAN ODT) 4 MG disintegrating tablet Take 1 tablet by mouth every 8 hours as needed for Nausea or Vomiting (Patient not taking: Reported on 9/28/2021) 12 tablet 0     No current facility-administered medications for this visit.      Allergies: Mushroom extract complex    Family History   Problem Relation Age of Onset    Lung Cancer Father     Cancer Father         lung cancer       Social History     Tobacco Use    Smoking status: Current Every Day Smoker     Packs/day: 0.25     Types: Cigarettes    Smokeless tobacco: Never Used    Tobacco comment: weaning   Substance Use Topics    Alcohol use: Not Currently       Review of Systems   Constitutional: Negative for fatigue, fever and unexpected weight change. HENT: Negative for hearing loss, nosebleeds and sore throat. Eyes: Negative for pain, redness and visual disturbance. Respiratory: Positive for cough. Negative for chest tightness and shortness of breath. Cardiovascular: Negative for chest pain, palpitations and leg swelling. Gastrointestinal: Negative for abdominal distention, abdominal pain, constipation, diarrhea, nausea and vomiting. Endocrine: Negative for cold intolerance, heat intolerance and polydipsia. Genitourinary: Negative for difficulty urinating, frequency and urgency. Musculoskeletal: Positive for arthralgias and neck pain. Negative for back pain and joint swelling. Skin: Negative for color change, rash and wound. Allergic/Immunologic: Negative for environmental allergies and food allergies. Neurological: Negative for seizures, light-headedness and headaches. Hematological: Negative for adenopathy. Does not bruise/bleed easily. Psychiatric/Behavioral: Negative for confusion, sleep disturbance and suicidal ideas. OBJECTIVE:  /70 (Site: Right Upper Arm, Position: Sitting, Cuff Size: Medium Adult)   Temp 98.6 °F (37 °C) (Temporal)   Ht 5' 10\" (1.778 m)   Wt 196 lb 6.4 oz (89.1 kg)   BMI 28.18 kg/m²   Physical Exam  Vitals reviewed. Constitutional:       Appearance: She is well-developed. HENT:      Head: Normocephalic and atraumatic. Eyes:      Pupils: Pupils are equal, round, and reactive to light. Cardiovascular:      Rate and Rhythm: Normal rate and regular rhythm. Pulmonary:      Effort: Pulmonary effort is normal.      Breath sounds: Normal breath sounds. No wheezing or rales. Chest:      Breasts:         Right: Normal. No inverted nipple, mass, nipple discharge, skin change or tenderness. Left: Mass present.  No inverted nipple, nipple discharge, skin change or tenderness. Comments: Mobile tender mass in the superior central portion just beneath the areola, and in the left upper outer breast.   Abdominal:      General: Bowel sounds are normal. There is no distension. Palpations: Abdomen is soft. Musculoskeletal:         General: Normal range of motion. Cervical back: Normal range of motion and neck supple. Lymphadenopathy:      Cervical: No cervical adenopathy. Upper Body:      Right upper body: No supraclavicular or axillary adenopathy. Left upper body: No supraclavicular or axillary adenopathy. Skin:     General: Skin is warm and dry. Neurological:      Mental Status: She is alert and oriented to person, place, and time. Psychiatric:         Behavior: Behavior normal.         Thought Content: Thought content normal.         Judgment: Judgment normal.         9/16/2019 US Left Breast       Impression   1. Probably benign left breast mass consistent with hamartoma. Recommend follow-up left diagnostic mammogram in 6 months, which will   be 6 months of follow-up. Results and recommendations discussed with the patient person at the   conclusion of the exam.    BI-RADS CATEGORY 3: PROBABLY BENIGN FINDING. Management Recommendation: Short interval follow-up or continued   surveillance mammography. Recommend left diagnostic mammogram in 6   months, which will be 6 months of follow-up. Signed by Dr Collin Levy on 9/16/2019 3:28 PM         9/20/2021 Diagnostic Mammogram Left      Impression   1. Ultrasonographic mass within the nipple with bloody nipple   discharge. Recommend surgical consult for consideration of excision. 2. No mammographic evidence of malignancy in the right breast.   3. Breast density notification will be sent to the patient.    Results and recommendations discussed with the patient in person at   the conclusion of the exam. Our office will perform notification and   assist in scheduling per protocol. BI-RADS CATEGORY 4: SUSPICIOUS ABNORMALITY. BIOPSY SHOULD BE   CONSIDERED. Management Recommendation: Biopsy should be performed in the absence   of clinical contraindication. Signed by Dr Conrad Boas           ASSESSMENT:   Diagnosis Orders   1. Left breast mass  US BREAST LIMITED LEFT       PLAN:  Orders Placed This Encounter   Procedures    US BREAST LIMITED LEFT     Standing Status:   Future     Standing Expiration Date:   9/28/2022     Order Specific Question:   Reason for exam:     Answer:   marking for surgery 10/11       The risks, benefits, and alternatives were discussed with the pt. She is willing to accept the risks and proceed with a left breast mass excisional biopsy with possible duct excision. She will have a preoperative ultrasound to cinthya the location of the lesion. The surgical risks included but not limited to bleeding, infection, recurrence, risk of needing further surgery, etc. The anesthetic risks included heart attacks, strokes, pneumonia, phlebitis, etc.  She is willing to accept all risks and proceed with surgery. No guarantees have been given. Return in about 1 month (around 10/28/2021) for Post-operative Visit.     DO Kvng 6/52/4839 1:45 PM

## 2021-09-29 NOTE — H&P (VIEW-ONLY)
[facility] Surgery History and Physical   SUBJECTIVE:  Ms. Albertina Cardozo is a 35 y.o. female who presents today with an abnormal mammogram. She states that a few years ago she was found to have a cyst and it was monitored. She notes pain in her left breast more than her right. She drinks a large amount of caffeine. She states that three weeks ago there was discharge at the nipple that was green/yellow with blood. This has not occurred since. She does not try to manually express it because it is painful. She started her menses at 14years old. She is currently still having menses . She has had 5 pregnancies with first live birth at 19years of age. She does not have a history of breast cancer in her family. She does smoke, but states she is trying to quit. Past Medical History:   Diagnosis Date    Hyperthyroidism     no treatment, checked every 6 months     Past Surgical History:   Procedure Laterality Date    CLOSED MANIPULATION SHOULDER Right 1/23/2020    RIGHT SHOULDER MANIPULATION UNDE ANESTHESIA performed by James Alex MD at [facility]      GALLBLADDER SURGERY      HC INJECT OTHER PERPHRL NERV Right 1/23/2020    CORTICOSTEROID INJECTION performed by James Alex MD at [facility] OR    UPPER GASTROINTESTINAL ENDOSCOPY       Current Outpatient Medications   Medication Sig Dispense Refill    ondansetron (ZOFRAN ODT) 4 MG disintegrating tablet Take 1 tablet by mouth every 8 hours as needed for Nausea or Vomiting (Patient not taking: Reported on 9/28/2021) 12 tablet 0     No current facility-administered medications for this visit.      Allergies: Mushroom extract complex    Family History   Problem Relation Age of Onset    Lung Cancer Father     Cancer Father         lung cancer       Social History     Tobacco Use    Smoking status: Current Every Day Smoker     Packs/day: 0.25     Types: Cigarettes    Smokeless tobacco: Never Used    Tobacco comment: weaning   Substance skin change or tenderness. Comments: Mobile tender mass in the superior central portion just beneath the areola, and in the left upper outer breast.   Abdominal:      General: Bowel sounds are normal. There is no distension. Palpations: Abdomen is soft. Musculoskeletal:         General: Normal range of motion. Cervical back: Normal range of motion and neck supple. Lymphadenopathy:      Cervical: No cervical adenopathy. Upper Body:      Right upper body: No supraclavicular or axillary adenopathy. Left upper body: No supraclavicular or axillary adenopathy. Skin:     General: Skin is warm and dry. Neurological:      Mental Status: She is alert and oriented to person, place, and time. Psychiatric:         Behavior: Behavior normal.         Thought Content: Thought content normal.         Judgment: Judgment normal.         9/16/2019 US Left Breast       Impression   1. Probably benign left breast mass consistent with hamartoma. Recommend follow-up left diagnostic mammogram in 6 months, which will   be 6 months of follow-up. Results and recommendations discussed with the patient person at the   conclusion of the exam.    BI-RADS CATEGORY 3: PROBABLY BENIGN FINDING. Management Recommendation: Short interval follow-up or continued   surveillance mammography. Recommend left diagnostic mammogram in 6   months, which will be 6 months of follow-up. Signed by Dr Jeff Parra on 9/16/2019 3:28 PM         9/20/2021 Diagnostic Mammogram Left      Impression   1. Ultrasonographic mass within the nipple with bloody nipple   discharge. Recommend surgical consult for consideration of excision. 2. No mammographic evidence of malignancy in the right breast.   3. Breast density notification will be sent to the patient.    Results and recommendations discussed with the patient in person at   the conclusion of the exam. Our office will perform notification and   assist in scheduling per protocol. BI-RADS CATEGORY 4: SUSPICIOUS ABNORMALITY. BIOPSY SHOULD BE   CONSIDERED. Management Recommendation: Biopsy should be performed in the absence   of clinical contraindication. Signed by Dr Otilio Swanson           ASSESSMENT:   Diagnosis Orders   1. Left breast mass  US BREAST LIMITED LEFT       PLAN:  Orders Placed This Encounter   Procedures    US BREAST LIMITED LEFT     Standing Status:   Future     Standing Expiration Date:   9/28/2022     Order Specific Question:   Reason for exam:     Answer:   marking for surgery 10/11       The risks, benefits, and alternatives were discussed with the pt. She is willing to accept the risks and proceed with a left breast mass excisional biopsy with possible duct excision. She will have a preoperative ultrasound to cinthya the location of the lesion. The surgical risks included but not limited to bleeding, infection, recurrence, risk of needing further surgery, etc. The anesthetic risks included heart attacks, strokes, pneumonia, phlebitis, etc.  She is willing to accept all risks and proceed with surgery. No guarantees have been given. Return in about 1 month (around 10/28/2021) for Post-operative Visit.     Barb Guerrero DO 2/20/2527 1:45 PM

## 2021-09-30 ENCOUNTER — TELEPHONE (OUTPATIENT)
Dept: SURGERY | Age: 41
End: 2021-09-30

## 2021-09-30 NOTE — TELEPHONE ENCOUNTER
Patient scheduled to have left breast ultrasound on 10/4/21 [facility] arrive at 12:20 for 12:30 appt.

## 2021-10-01 ENCOUNTER — TELEPHONE (OUTPATIENT)
Dept: SURGERY | Age: 41
End: 2021-10-01

## 2021-10-07 ENCOUNTER — HOSPITAL ENCOUNTER (OUTPATIENT)
Dept: PREADMISSION TESTING | Age: 41
Discharge: HOME | End: 2021-10-11
Payer: MEDICAID

## 2021-10-07 VITALS — HEIGHT: 71 IN | WEIGHT: 176 LBS | BODY MASS INDEX: 24.64 KG/M2

## 2021-10-07 LAB
BASOPHILS ABSOLUTE: 0.1 K/UL (ref 0–0.2)
BASOPHILS RELATIVE PERCENT: 0.6 % (ref 0–1)
EOSINOPHILS ABSOLUTE: 0.3 K/UL (ref 0–0.6)
EOSINOPHILS RELATIVE PERCENT: 3 % (ref 0–5)
HCT VFR BLD CALC: 45.1 % (ref 37–47)
HEMOGLOBIN: 14.3 G/DL (ref 12–16)
IMMATURE GRANULOCYTES #: 0.1 K/UL
LYMPHOCYTES ABSOLUTE: 2.4 K/UL (ref 1.1–4.5)
LYMPHOCYTES RELATIVE PERCENT: 22 % (ref 20–40)
MCH RBC QN AUTO: 28.9 PG (ref 27–31)
MCHC RBC AUTO-ENTMCNC: 31.7 G/DL (ref 33–37)
MCV RBC AUTO: 91.3 FL (ref 81–99)
MONOCYTES ABSOLUTE: 0.7 K/UL (ref 0–0.9)
MONOCYTES RELATIVE PERCENT: 6.2 % (ref 0–10)
NEUTROPHILS ABSOLUTE: 7.3 K/UL (ref 1.5–7.5)
NEUTROPHILS RELATIVE PERCENT: 67.7 % (ref 50–65)
PDW BLD-RTO: 12.9 % (ref 11.5–14.5)
PLATELET # BLD: 297 K/UL (ref 130–400)
PMV BLD AUTO: 10.6 FL (ref 9.4–12.3)
RBC # BLD: 4.94 M/UL (ref 4.2–5.4)
SARS-COV-2, PCR: NOT DETECTED
WBC # BLD: 10.7 K/UL (ref 4.8–10.8)

## 2021-10-07 PROCEDURE — U0005 INFEC AGEN DETEC AMPLI PROBE: HCPCS

## 2021-10-07 PROCEDURE — 85025 COMPLETE CBC W/AUTO DIFF WBC: CPT

## 2021-10-07 PROCEDURE — U0003 INFECTIOUS AGENT DETECTION BY NUCLEIC ACID (DNA OR RNA); SEVERE ACUTE RESPIRATORY SYNDROME CORONAVIRUS 2 (SARS-COV-2) (CORONAVIRUS DISEASE [COVID-19]), AMPLIFIED PROBE TECHNIQUE, MAKING USE OF HIGH THROUGHPUT TECHNOLOGIES AS DESCRIBED BY CMS-2020-01-R: HCPCS

## 2021-10-11 ENCOUNTER — ANESTHESIA (OUTPATIENT)
Dept: OPERATING ROOM | Age: 41
End: 2021-10-11
Payer: MEDICAID

## 2021-10-11 ENCOUNTER — HOSPITAL ENCOUNTER (OUTPATIENT)
Age: 41
Setting detail: OUTPATIENT SURGERY
Discharge: HOME | End: 2021-10-11
Attending: SURGERY | Admitting: SURGERY
Payer: MEDICAID

## 2021-10-11 ENCOUNTER — ANESTHESIA EVENT (OUTPATIENT)
Dept: OPERATING ROOM | Age: 41
End: 2021-10-11
Payer: MEDICAID

## 2021-10-11 VITALS — SYSTOLIC BLOOD PRESSURE: 130 MMHG | TEMPERATURE: 96.9 F | OXYGEN SATURATION: 94 % | DIASTOLIC BLOOD PRESSURE: 75 MMHG

## 2021-10-11 VITALS
WEIGHT: 176 LBS | HEIGHT: 71 IN | SYSTOLIC BLOOD PRESSURE: 131 MMHG | TEMPERATURE: 97.2 F | OXYGEN SATURATION: 100 % | HEART RATE: 50 BPM | BODY MASS INDEX: 24.64 KG/M2 | DIASTOLIC BLOOD PRESSURE: 75 MMHG | RESPIRATION RATE: 16 BRPM

## 2021-10-11 DIAGNOSIS — G89.18 POSTOPERATIVE PAIN: ICD-10-CM

## 2021-10-11 DIAGNOSIS — N63.20 LEFT BREAST MASS: Primary | ICD-10-CM

## 2021-10-11 LAB — HCG(URINE) PREGNANCY TEST: NEGATIVE

## 2021-10-11 PROCEDURE — 7100000000 HC PACU RECOVERY - FIRST 15 MIN: Performed by: SURGERY

## 2021-10-11 PROCEDURE — 7100000010 HC PHASE II RECOVERY - FIRST 15 MIN: Performed by: SURGERY

## 2021-10-11 PROCEDURE — 6360000002 HC RX W HCPCS: Performed by: SURGERY

## 2021-10-11 PROCEDURE — 19120 REMOVAL OF BREAST LESION: CPT | Performed by: SURGERY

## 2021-10-11 PROCEDURE — 7100000001 HC PACU RECOVERY - ADDTL 15 MIN: Performed by: SURGERY

## 2021-10-11 PROCEDURE — 3600000004 HC SURGERY LEVEL 4 BASE: Performed by: SURGERY

## 2021-10-11 PROCEDURE — 7100000011 HC PHASE II RECOVERY - ADDTL 15 MIN: Performed by: SURGERY

## 2021-10-11 PROCEDURE — 3700000000 HC ANESTHESIA ATTENDED CARE: Performed by: SURGERY

## 2021-10-11 PROCEDURE — 3700000001 HC ADD 15 MINUTES (ANESTHESIA): Performed by: SURGERY

## 2021-10-11 PROCEDURE — 6370000000 HC RX 637 (ALT 250 FOR IP): Performed by: SURGERY

## 2021-10-11 PROCEDURE — 6360000002 HC RX W HCPCS

## 2021-10-11 PROCEDURE — 2709999900 HC NON-CHARGEABLE SUPPLY: Performed by: SURGERY

## 2021-10-11 PROCEDURE — 3600000014 HC SURGERY LEVEL 4 ADDTL 15MIN: Performed by: SURGERY

## 2021-10-11 PROCEDURE — 2500000003 HC RX 250 WO HCPCS: Performed by: SURGERY

## 2021-10-11 PROCEDURE — 2500000003 HC RX 250 WO HCPCS

## 2021-10-11 PROCEDURE — 2580000003 HC RX 258: Performed by: ANESTHESIOLOGY

## 2021-10-11 PROCEDURE — 84703 CHORIONIC GONADOTROPIN ASSAY: CPT

## 2021-10-11 PROCEDURE — 88307 TISSUE EXAM BY PATHOLOGIST: CPT

## 2021-10-11 RX ORDER — SODIUM CHLORIDE 9 MG/ML
25 INJECTION, SOLUTION INTRAVENOUS PRN
Status: DISCONTINUED | OUTPATIENT
Start: 2021-10-11 | End: 2021-10-11 | Stop reason: HOSPADM

## 2021-10-11 RX ORDER — SODIUM CHLORIDE 0.9 % (FLUSH) 0.9 %
5-40 SYRINGE (ML) INJECTION PRN
Status: DISCONTINUED | OUTPATIENT
Start: 2021-10-11 | End: 2021-10-11 | Stop reason: HOSPADM

## 2021-10-11 RX ORDER — SODIUM CHLORIDE, SODIUM LACTATE, POTASSIUM CHLORIDE, CALCIUM CHLORIDE 600; 310; 30; 20 MG/100ML; MG/100ML; MG/100ML; MG/100ML
INJECTION, SOLUTION INTRAVENOUS CONTINUOUS
Status: DISCONTINUED | OUTPATIENT
Start: 2021-10-11 | End: 2021-10-11 | Stop reason: HOSPADM

## 2021-10-11 RX ORDER — ENALAPRILAT 2.5 MG/2ML
1.25 INJECTION INTRAVENOUS
Status: DISCONTINUED | OUTPATIENT
Start: 2021-10-11 | End: 2021-10-11 | Stop reason: HOSPADM

## 2021-10-11 RX ORDER — SODIUM CHLORIDE 0.9 % (FLUSH) 0.9 %
10 SYRINGE (ML) INJECTION PRN
Status: DISCONTINUED | OUTPATIENT
Start: 2021-10-11 | End: 2021-10-11 | Stop reason: HOSPADM

## 2021-10-11 RX ORDER — HYDRALAZINE HYDROCHLORIDE 20 MG/ML
5 INJECTION INTRAMUSCULAR; INTRAVENOUS EVERY 10 MIN PRN
Status: DISCONTINUED | OUTPATIENT
Start: 2021-10-11 | End: 2021-10-11 | Stop reason: HOSPADM

## 2021-10-11 RX ORDER — DEXAMETHASONE SODIUM PHOSPHATE 4 MG/ML
INJECTION, SOLUTION INTRA-ARTICULAR; INTRALESIONAL; INTRAMUSCULAR; INTRAVENOUS; SOFT TISSUE PRN
Status: DISCONTINUED | OUTPATIENT
Start: 2021-10-11 | End: 2021-10-11 | Stop reason: ALTCHOICE

## 2021-10-11 RX ORDER — MIDAZOLAM HYDROCHLORIDE 1 MG/ML
INJECTION INTRAMUSCULAR; INTRAVENOUS PRN
Status: DISCONTINUED | OUTPATIENT
Start: 2021-10-11 | End: 2021-10-11 | Stop reason: SDUPTHER

## 2021-10-11 RX ORDER — LABETALOL HYDROCHLORIDE 5 MG/ML
5 INJECTION, SOLUTION INTRAVENOUS EVERY 10 MIN PRN
Status: DISCONTINUED | OUTPATIENT
Start: 2021-10-11 | End: 2021-10-11 | Stop reason: HOSPADM

## 2021-10-11 RX ORDER — SODIUM CHLORIDE 0.9 % (FLUSH) 0.9 %
10 SYRINGE (ML) INJECTION EVERY 12 HOURS SCHEDULED
Status: DISCONTINUED | OUTPATIENT
Start: 2021-10-11 | End: 2021-10-11 | Stop reason: HOSPADM

## 2021-10-11 RX ORDER — FENTANYL CITRATE 50 UG/ML
INJECTION, SOLUTION INTRAMUSCULAR; INTRAVENOUS PRN
Status: DISCONTINUED | OUTPATIENT
Start: 2021-10-11 | End: 2021-10-11 | Stop reason: SDUPTHER

## 2021-10-11 RX ORDER — BUPIVACAINE HYDROCHLORIDE 2.5 MG/ML
INJECTION, SOLUTION INFILTRATION; PERINEURAL PRN
Status: DISCONTINUED | OUTPATIENT
Start: 2021-10-11 | End: 2021-10-11 | Stop reason: ALTCHOICE

## 2021-10-11 RX ORDER — LIDOCAINE HYDROCHLORIDE 10 MG/ML
1 INJECTION, SOLUTION EPIDURAL; INFILTRATION; INTRACAUDAL; PERINEURAL
Status: DISCONTINUED | OUTPATIENT
Start: 2021-10-11 | End: 2021-10-11 | Stop reason: HOSPADM

## 2021-10-11 RX ORDER — DIPHENHYDRAMINE HYDROCHLORIDE 50 MG/ML
12.5 INJECTION INTRAMUSCULAR; INTRAVENOUS
Status: DISCONTINUED | OUTPATIENT
Start: 2021-10-11 | End: 2021-10-11 | Stop reason: HOSPADM

## 2021-10-11 RX ORDER — HYDROMORPHONE HYDROCHLORIDE 1 MG/ML
0.25 INJECTION, SOLUTION INTRAMUSCULAR; INTRAVENOUS; SUBCUTANEOUS EVERY 5 MIN PRN
Status: DISCONTINUED | OUTPATIENT
Start: 2021-10-11 | End: 2021-10-11 | Stop reason: HOSPADM

## 2021-10-11 RX ORDER — ONDANSETRON 2 MG/ML
INJECTION INTRAMUSCULAR; INTRAVENOUS PRN
Status: DISCONTINUED | OUTPATIENT
Start: 2021-10-11 | End: 2021-10-11 | Stop reason: SDUPTHER

## 2021-10-11 RX ORDER — HYDROCODONE BITARTRATE AND ACETAMINOPHEN 5; 325 MG/1; MG/1
1 TABLET ORAL EVERY 6 HOURS PRN
Qty: 10 TABLET | Refills: 0 | Status: SHIPPED | OUTPATIENT
Start: 2021-10-11 | End: 2021-10-11 | Stop reason: HOSPADM

## 2021-10-11 RX ORDER — SODIUM CHLORIDE 0.9 % (FLUSH) 0.9 %
5-40 SYRINGE (ML) INJECTION EVERY 12 HOURS SCHEDULED
Status: DISCONTINUED | OUTPATIENT
Start: 2021-10-11 | End: 2021-10-11 | Stop reason: HOSPADM

## 2021-10-11 RX ORDER — SCOLOPAMINE TRANSDERMAL SYSTEM 1 MG/1
1 PATCH, EXTENDED RELEASE TRANSDERMAL ONCE
Status: DISCONTINUED | OUTPATIENT
Start: 2021-10-11 | End: 2021-10-11 | Stop reason: HOSPADM

## 2021-10-11 RX ORDER — MORPHINE SULFATE 4 MG/ML
4 INJECTION, SOLUTION INTRAMUSCULAR; INTRAVENOUS
Status: DISCONTINUED | OUTPATIENT
Start: 2021-10-11 | End: 2021-10-11 | Stop reason: HOSPADM

## 2021-10-11 RX ORDER — ROPIVACAINE HYDROCHLORIDE 2 MG/ML
INJECTION, SOLUTION EPIDURAL; INFILTRATION; PERINEURAL PRN
Status: DISCONTINUED | OUTPATIENT
Start: 2021-10-11 | End: 2021-10-11 | Stop reason: ALTCHOICE

## 2021-10-11 RX ORDER — MEPERIDINE HYDROCHLORIDE 25 MG/ML
12.5 INJECTION INTRAMUSCULAR; INTRAVENOUS; SUBCUTANEOUS EVERY 5 MIN PRN
Status: DISCONTINUED | OUTPATIENT
Start: 2021-10-11 | End: 2021-10-11 | Stop reason: HOSPADM

## 2021-10-11 RX ORDER — MORPHINE SULFATE 2 MG/ML
2 INJECTION, SOLUTION INTRAMUSCULAR; INTRAVENOUS EVERY 5 MIN PRN
Status: DISCONTINUED | OUTPATIENT
Start: 2021-10-11 | End: 2021-10-11 | Stop reason: HOSPADM

## 2021-10-11 RX ORDER — MIDAZOLAM HYDROCHLORIDE 1 MG/ML
2 INJECTION INTRAMUSCULAR; INTRAVENOUS
Status: DISCONTINUED | OUTPATIENT
Start: 2021-10-11 | End: 2021-10-11 | Stop reason: HOSPADM

## 2021-10-11 RX ORDER — HYDROCODONE BITARTRATE AND ACETAMINOPHEN 5; 325 MG/1; MG/1
1 TABLET ORAL
Status: COMPLETED | OUTPATIENT
Start: 2021-10-11 | End: 2021-10-11

## 2021-10-11 RX ORDER — PROMETHAZINE HYDROCHLORIDE 25 MG/ML
6.25 INJECTION, SOLUTION INTRAMUSCULAR; INTRAVENOUS
Status: DISCONTINUED | OUTPATIENT
Start: 2021-10-11 | End: 2021-10-11 | Stop reason: HOSPADM

## 2021-10-11 RX ORDER — LIDOCAINE HYDROCHLORIDE 10 MG/ML
INJECTION, SOLUTION EPIDURAL; INFILTRATION; INTRACAUDAL; PERINEURAL PRN
Status: DISCONTINUED | OUTPATIENT
Start: 2021-10-11 | End: 2021-10-11 | Stop reason: SDUPTHER

## 2021-10-11 RX ORDER — MORPHINE SULFATE 4 MG/ML
4 INJECTION, SOLUTION INTRAMUSCULAR; INTRAVENOUS EVERY 5 MIN PRN
Status: DISCONTINUED | OUTPATIENT
Start: 2021-10-11 | End: 2021-10-11 | Stop reason: HOSPADM

## 2021-10-11 RX ORDER — HYDROMORPHONE HYDROCHLORIDE 1 MG/ML
0.5 INJECTION, SOLUTION INTRAMUSCULAR; INTRAVENOUS; SUBCUTANEOUS EVERY 5 MIN PRN
Status: DISCONTINUED | OUTPATIENT
Start: 2021-10-11 | End: 2021-10-11 | Stop reason: HOSPADM

## 2021-10-11 RX ORDER — PROPOFOL 10 MG/ML
INJECTION, EMULSION INTRAVENOUS PRN
Status: DISCONTINUED | OUTPATIENT
Start: 2021-10-11 | End: 2021-10-11 | Stop reason: SDUPTHER

## 2021-10-11 RX ORDER — HYDROCODONE BITARTRATE AND ACETAMINOPHEN 5; 325 MG/1; MG/1
1 TABLET ORAL EVERY 6 HOURS PRN
Qty: 10 TABLET | Refills: 0 | Status: SHIPPED | OUTPATIENT
Start: 2021-10-11 | End: 2021-10-14

## 2021-10-11 RX ORDER — METOCLOPRAMIDE HYDROCHLORIDE 5 MG/ML
10 INJECTION INTRAMUSCULAR; INTRAVENOUS
Status: DISCONTINUED | OUTPATIENT
Start: 2021-10-11 | End: 2021-10-11 | Stop reason: HOSPADM

## 2021-10-11 RX ORDER — FENTANYL CITRATE 50 UG/ML
50 INJECTION, SOLUTION INTRAMUSCULAR; INTRAVENOUS
Status: DISCONTINUED | OUTPATIENT
Start: 2021-10-11 | End: 2021-10-11 | Stop reason: HOSPADM

## 2021-10-11 RX ORDER — DEXAMETHASONE SODIUM PHOSPHATE 10 MG/ML
INJECTION, SOLUTION INTRAMUSCULAR; INTRAVENOUS PRN
Status: DISCONTINUED | OUTPATIENT
Start: 2021-10-11 | End: 2021-10-11 | Stop reason: SDUPTHER

## 2021-10-11 RX ADMIN — DEXAMETHASONE SODIUM PHOSPHATE 10 MG: 10 INJECTION, SOLUTION INTRAMUSCULAR; INTRAVENOUS at 08:15

## 2021-10-11 RX ADMIN — FENTANYL CITRATE 100 MCG: 50 INJECTION, SOLUTION INTRAMUSCULAR; INTRAVENOUS at 08:02

## 2021-10-11 RX ADMIN — FENTANYL CITRATE 100 MCG: 50 INJECTION, SOLUTION INTRAMUSCULAR; INTRAVENOUS at 08:15

## 2021-10-11 RX ADMIN — Medication 2000 MG: at 08:06

## 2021-10-11 RX ADMIN — PROPOFOL 50 MG: 10 INJECTION, EMULSION INTRAVENOUS at 08:06

## 2021-10-11 RX ADMIN — PROPOFOL 50 MG: 10 INJECTION, EMULSION INTRAVENOUS at 08:20

## 2021-10-11 RX ADMIN — PROPOFOL 50 MG: 10 INJECTION, EMULSION INTRAVENOUS at 08:15

## 2021-10-11 RX ADMIN — SODIUM CHLORIDE, POTASSIUM CHLORIDE, SODIUM LACTATE AND CALCIUM CHLORIDE: 600; 310; 30; 20 INJECTION, SOLUTION INTRAVENOUS at 06:44

## 2021-10-11 RX ADMIN — ONDANSETRON HYDROCHLORIDE 4 MG: 2 INJECTION, SOLUTION INTRAMUSCULAR; INTRAVENOUS at 08:15

## 2021-10-11 RX ADMIN — PROPOFOL 50 MG: 10 INJECTION, EMULSION INTRAVENOUS at 08:11

## 2021-10-11 RX ADMIN — PROPOFOL 50 MG: 10 INJECTION, EMULSION INTRAVENOUS at 08:07

## 2021-10-11 RX ADMIN — MIDAZOLAM 2 MG: 1 INJECTION INTRAMUSCULAR; INTRAVENOUS at 07:56

## 2021-10-11 RX ADMIN — HYDROCODONE BITARTRATE AND ACETAMINOPHEN 1 TABLET: 5; 325 TABLET ORAL at 09:51

## 2021-10-11 RX ADMIN — PROPOFOL 120 MG: 10 INJECTION, EMULSION INTRAVENOUS at 08:29

## 2021-10-11 RX ADMIN — PROPOFOL 50 MG: 10 INJECTION, EMULSION INTRAVENOUS at 08:24

## 2021-10-11 RX ADMIN — LIDOCAINE HYDROCHLORIDE 50 MG: 10 INJECTION, SOLUTION EPIDURAL; INFILTRATION; INTRACAUDAL; PERINEURAL at 08:06

## 2021-10-11 ASSESSMENT — PAIN DESCRIPTION - DESCRIPTORS
DESCRIPTORS: ACHING
DESCRIPTORS: ACHING

## 2021-10-11 ASSESSMENT — LIFESTYLE VARIABLES: SMOKING_STATUS: 1

## 2021-10-11 ASSESSMENT — PAIN DESCRIPTION - PAIN TYPE
TYPE: SURGICAL PAIN
TYPE: SURGICAL PAIN

## 2021-10-11 ASSESSMENT — PAIN DESCRIPTION - LOCATION
LOCATION: BREAST
LOCATION: BREAST

## 2021-10-11 ASSESSMENT — PAIN SCALES - GENERAL
PAINLEVEL_OUTOF10: 8
PAINLEVEL_OUTOF10: 5
PAINLEVEL_OUTOF10: 0
PAINLEVEL_OUTOF10: 8

## 2021-10-11 ASSESSMENT — PAIN DESCRIPTION - ORIENTATION
ORIENTATION: LEFT
ORIENTATION: LEFT

## 2021-10-11 ASSESSMENT — PAIN DESCRIPTION - FREQUENCY
FREQUENCY: CONTINUOUS
FREQUENCY: CONTINUOUS

## 2021-10-11 ASSESSMENT — ENCOUNTER SYMPTOMS: SHORTNESS OF BREATH: 0

## 2021-10-11 NOTE — DISCHARGE INSTR - ACTIVITY
No heavy lifting. May shower tomorrow. Do not soak in tub. Do not drive while on pain medications. Wear compressive bra at all times.

## 2021-10-11 NOTE — ANESTHESIA POSTPROCEDURE EVALUATION
Department of Anesthesiology  Postprocedure Note    Patient: Romayne Ravel  MRN: [id number]  YOB: 1980  Date of evaluation: 10/11/2021  Time:  9:08 AM     Procedure Summary     Date: 10/11/21 Room / Location: [address]    Anesthesia Start: 0800 Anesthesia Stop: 0908    Procedure: EXCISION LEFT BREAST BIOPSY AND POSS DUCT EXCISION PEC BLOCK (Left ) Diagnosis: (LEFT BREAST MASS)    Surgeons: Elizabeth Mcpherson DO Responsible Provider: CHELSEA Aviles CRNA    Anesthesia Type: general ASA Status: 2          Anesthesia Type: general    Ken Phase I:      Ken Phase II:      Last vitals: Reviewed and per EMR flowsheets.        Anesthesia Post Evaluation

## 2021-10-11 NOTE — ANESTHESIA PRE PROCEDURE
CORTICOSTEROID INJECTION performed by Jessie Treviño MD at [facility] OR    UPPER GASTROINTESTINAL ENDOSCOPY         Social History:    Social History     Tobacco Use    Smoking status: Former Smoker     Packs/day: 0.25     Years: 30.00     Pack years: 7.50     Types: Cigarettes     Quit date: 10/8/2021    Smokeless tobacco: Never Used   Substance Use Topics    Alcohol use: Not Currently                                Counseling given: Not Answered      Vital Signs (Current): There were no vitals filed for this visit. BP Readings from Last 3 Encounters:   10/11/21 132/79   09/28/21 114/70   01/23/20 116/72       NPO Status:                                                                                 BMI:   Wt Readings from Last 3 Encounters:   10/07/21 176 lb (79.8 kg)   09/28/21 196 lb 6.4 oz (89.1 kg)   01/23/20 186 lb (84.4 kg)     There is no height or weight on file to calculate BMI.    CBC:   Lab Results   Component Value Date    WBC 10.7 10/07/2021    RBC 4.94 10/07/2021    HGB 14.3 10/07/2021    HCT 45.1 10/07/2021    MCV 91.3 10/07/2021    RDW 12.9 10/07/2021     10/07/2021       CMP:   Lab Results   Component Value Date     10/24/2019    K 3.7 10/24/2019     10/24/2019    CO2 20 10/24/2019    BUN 10 10/24/2019    CREATININE 0.7 10/24/2019    LABGLOM >60 10/24/2019    GLUCOSE 113 10/24/2019    PROT 6.7 10/24/2019    CALCIUM 8.9 10/24/2019    BILITOT <0.2 10/24/2019    ALKPHOS 46 10/24/2019    AST 15 10/24/2019    ALT 13 10/24/2019       POC Tests: No results for input(s): POCGLU, POCNA, POCK, POCCL, POCBUN, POCHEMO, POCHCT in the last 72 hours.     Coags:   Lab Results   Component Value Date    PROTIME 12.4 10/24/2019    INR 0.98 10/24/2019    APTT 27.9 10/24/2019       HCG (If Applicable):   Lab Results   Component Value Date    PREGTESTUR Negative 01/23/2020        ABGs: No results found for: PHART, PO2ART, WQL1FTK, KUO6AZG, BEART, Q9VNXWJI     Type & Screen (If Applicable):  No results found for: LABABO, LABRH    Anesthesia Evaluation  Patient summary reviewed and Nursing notes reviewed no history of anesthetic complications:   Airway: Mallampati: II  TM distance: >3 FB   Neck ROM: full  Mouth opening: > = 3 FB Dental: normal exam   (+) upper dentures      Pulmonary:Negative Pulmonary ROS and normal exam  breath sounds clear to auscultation  (+) asthma: current smoker    (-) shortness of breath          Patient smoked on day of surgery. ROS comment: No tob for 2 days   Cardiovascular:        (-) hypertension, CAD,  angina and  CHF    NYHA Classification: I  ECG reviewed  Rhythm: regular  Rate: normal           Beta Blocker:  Not on Beta Blocker         Neuro/Psych:   Negative Neuro/Psych ROS     (-) seizures, CVA and depression/anxiety            GI/Hepatic/Renal: Neg GI/Hepatic/Renal ROS       (-) hiatal hernia and GERD       Endo/Other: Negative Endo/Other ROS   (+) hyperthyroidism: arthritis: OA., . Pt had PAT visit. Abdominal:       Abdomen: soft. Vascular: Other Findings:               Anesthesia Plan      general     ASA 2     (Iv zofran within 30 min of closing )  Induction: intravenous. BIS  MIPS: Postoperative opioids intended and Prophylactic antiemetics administered. Anesthetic plan and risks discussed with patient. Use of blood products discussed with patient whom. Plan discussed with CRNA.     Attending anesthesiologist reviewed and agrees with Kev Burt MD   10/11/2021

## 2021-10-11 NOTE — INTERVAL H&P NOTE
Update History & Physical    The patient's History and Physical of September 29, 2021 was reviewed with the patient and I examined the patient. There was no change. The surgical site was confirmed by the patient and me. Plan: The risks, benefits, expected outcome, and alternative to the recommended procedure have been discussed with the patient. Patient understands and wants to proceed with the procedure.      Electronically signed by Ren Garner DO on 85/32/7175 at 7:44 AM

## 2021-10-11 NOTE — OP NOTE
Operative Note      Patient: Kurtis Alvarado  YOB: 1980  MRN: [id number]    Date of Procedure: 10/11/2021    Pre-Op Diagnosis: LEFT BREAST MASS, left nipple discharge    Post-Op Diagnosis: Same       Procedure(s):  EXCISION LEFT BREAST BIOPSY AND POSS DUCT EXCISION PEC BLOCK    Surgeon(s):  Dewitt Cowden, DO    Assistant:   First Assistant: Beto Berger    Anesthesia: Monitor Anesthesia Care    Estimated Blood Loss (mL): Minimal    Complications: None    Specimens:   ID Type Source Tests Collected by Time Destination   A : LEFT BREAST TISSUE; STITCH SHORT = SUPERIOR, LONG = LATERAL, SINGLE = DEEP Tissue Breast SURGICAL PATHOLOGY Dewitt Cowden, DO 05/76/4905 0827        Implants:  * No implants in log *      Drains: * No LDAs found *      Detailed Description of Procedure:   After informed consent was obtained, the patient was taken to the operating   room where she was placed on the table in the supine position. After general anesthesia was obtained, bilateral lower extremity SCDs were placed. The skin was then prepped with chlorhexidine and administered a left-sided pectoral  block. This was done utilizing 50 mL of 0.2% ropivacaine, 8 mg of  Decadron, and 50 mL of saline. The intercostal perforators were injected  under ultrasound guidance, then the inframammary crease as well  as the infraclavicular area were injected. The interpectoral space was then injected  as well as the lateral intercostal perforators again with ultrasound  guidance. The patient was then fully prepped and draped in a sterile fashion. The lesion was in the 12 o'clock region beneath the nipple. A superior periareolar incision was made. Local anesthetic was used to anesthetize the skin at the previously marked incision site. A 15 blade scalpel was used to make the incision. A 00 lacrimal duct probe was inserted through the discharging duct and tracked into the area of firmness associated with the mass. This was used as a guide to resect the lesion. The Bovie electrocautery was used to continue dissection down through the  subcutaneous tissues using the wire as a guide. Superior and inferior flaps were raised and the wire was delivered into the wound from the skin. A small wedge of breast tissue was divided with cautery from the surrounding breast tissue. This was marked with short double stitch superior, long double lateral, and single stitch deep. The duct was examined and fluid contained within it was expressed. The posterior portion was cauterized. The decision was made not to excise the duct, as it was felt the mass was the contributing factor and this was excised. The wound was irrigated with sterile water. Thre breast tissue was then examined and hemostasis remained. The wound was then closed in a layered fashion. Clips were placed surrounding the area of excision. The breast tissue was approximated in a radial fashion to provide a better oncoplastic closure. This was reapproximated with 3-0 Vicryl in a running fashion. The deep dermis was closed with 3-0 Vicryl in a running fashion. The skin was closed with 4-0 Stratafix in a running subcuticular fashion. A final dressing of Dermabond was placed over the incision site. The patient tolerated the procedure well. There were no immediate complications. All sponge and instrument counts were correct x 2. Ms. Cha Martinez was taken to the recovery room in stable condition.           _____________________________________  Merlin Manley DO        Electronically signed by Merlin Manley DO on 67/66/6314 at 8:50 AM

## 2021-10-21 ENCOUNTER — OFFICE VISIT (OUTPATIENT)
Dept: SURGERY | Age: 41
End: 2021-10-21

## 2021-10-21 VITALS
HEIGHT: 71 IN | DIASTOLIC BLOOD PRESSURE: 64 MMHG | SYSTOLIC BLOOD PRESSURE: 98 MMHG | TEMPERATURE: 97.4 F | BODY MASS INDEX: 27.44 KG/M2 | WEIGHT: 196 LBS

## 2021-10-21 DIAGNOSIS — D24.2 INTRADUCTAL PAPILLOMA OF BREAST, LEFT: ICD-10-CM

## 2021-10-21 DIAGNOSIS — Z09 POSTOPERATIVE EXAMINATION: Primary | ICD-10-CM

## 2021-10-21 PROCEDURE — 99024 POSTOP FOLLOW-UP VISIT: CPT | Performed by: SURGERY

## 2021-10-21 NOTE — PROGRESS NOTES
Postop Progress Note    Subjective    Marie Tolliver presents to the office for postop follow up s/p left breast mass excision. Objective    Vitals:    10/21/21 1457   BP: 98/64   Temp: 97.4 °F (36.3 °C)     General: alert, cooperative and no distress  Incision: healing well, c/d/i. Small area of ecchymosis to left lateral chest.     Pathology   FINAL DIAGNOSIS:   Breast, left breast excisional biopsy: Benign intraductal papilloma with   surrounding sclerosis and cautery artifact, negative for obvious evidence of malignancy. Assessment  Doing well postoperatively. Plan  1. Continue any current medications  2. Wound care discussed  3. Pt is to increase activities as tolerated  4. Usual diet  5. Follow up: will need left breast diagnostic mammogram in 6 months. RTC following imaging.     Electronically signed by Laurent Marshall DO on 63/58/9651 at 3:01 PM

## 2021-11-30 ENCOUNTER — TELEPHONE (OUTPATIENT)
Dept: SURGERY | Age: 41
End: 2021-11-30

## 2021-11-30 NOTE — TELEPHONE ENCOUNTER
I s/w patient, she said she noticed a hard knot on her left breast areola area last week, its warm to touch & hurts. She said when she squeezes or mashes on her breast a green color discharge comes out of her nipple. She says she was diagnosed with strep throat & pink eye last week & is on antibiotics. I discussed with Dr Michela Rosado, she said to tell her to apply warm compress to the area & have her see her tomorrow. I told patient this & made appt for tomorrow morning. Pt informed & said thanks.

## 2021-11-30 NOTE — TELEPHONE ENCOUNTER
Pt had Lt Br Surgery 10/11/21 - She has a hard knot in same breast/nipple x 1 wk - During that time she has also had strep throat and pink eye     Cc: ILIANA Nava [place]

## 2021-12-01 ENCOUNTER — OFFICE VISIT (OUTPATIENT)
Dept: SURGERY | Age: 41
End: 2021-12-01
Payer: MEDICAID

## 2021-12-01 VITALS
HEIGHT: 70 IN | BODY MASS INDEX: 28.18 KG/M2 | DIASTOLIC BLOOD PRESSURE: 62 MMHG | TEMPERATURE: 96.8 F | WEIGHT: 196.8 LBS | SYSTOLIC BLOOD PRESSURE: 102 MMHG

## 2021-12-01 DIAGNOSIS — N61.0 MASTITIS: ICD-10-CM

## 2021-12-01 DIAGNOSIS — D36.9 INTRADUCTAL PAPILLOMA: ICD-10-CM

## 2021-12-01 DIAGNOSIS — N64.4 MASTALGIA: Primary | ICD-10-CM

## 2021-12-01 PROCEDURE — G8427 DOCREV CUR MEDS BY ELIG CLIN: HCPCS | Performed by: SURGERY

## 2021-12-01 PROCEDURE — 1036F TOBACCO NON-USER: CPT | Performed by: SURGERY

## 2021-12-01 PROCEDURE — G8419 CALC BMI OUT NRM PARAM NOF/U: HCPCS | Performed by: SURGERY

## 2021-12-01 PROCEDURE — G8484 FLU IMMUNIZE NO ADMIN: HCPCS | Performed by: SURGERY

## 2021-12-01 PROCEDURE — 99213 OFFICE O/P EST LOW 20 MIN: CPT | Performed by: SURGERY

## 2021-12-01 RX ORDER — SULFAMETHOXAZOLE AND TRIMETHOPRIM 800; 160 MG/1; MG/1
1 TABLET ORAL 2 TIMES DAILY
Qty: 14 TABLET | Refills: 0 | Status: SHIPPED | OUTPATIENT
Start: 2021-12-01 | End: 2021-12-08

## 2021-12-01 ASSESSMENT — ENCOUNTER SYMPTOMS
COUGH: 1
SORE THROAT: 0
COLOR CHANGE: 0
SHORTNESS OF BREATH: 0
ABDOMINAL PAIN: 0
EYE REDNESS: 0
VOMITING: 0
ABDOMINAL DISTENTION: 0
DIARRHEA: 0
BACK PAIN: 0
CHEST TIGHTNESS: 0
CONSTIPATION: 0
NAUSEA: 0
EYE PAIN: 0

## 2021-12-01 NOTE — PROGRESS NOTES
SUBJECTIVE:  Ms. Maximo Herrera is a 35 y.o. female who presents today with tenderness to the left breast that started two days prior to Thanksgiving. She states there was drainage that was green from the nipple if she squeezed, and she is concerned because her breast feels warm and has a knot. She had an intraductal papilloma removed in October and had recovered well prior to these issues starting last week. Patient's medications, allergies, past medical, surgical, social and family histories werereviewed and updated as appropriate. Review of Systems   Constitutional: Negative for fatigue, fever and unexpected weight change. HENT: Negative for hearing loss, nosebleeds and sore throat. Eyes: Negative for pain, redness and visual disturbance. Respiratory: Positive for cough. Negative for chest tightness and shortness of breath. Cardiovascular: Negative for chest pain, palpitations and leg swelling. Gastrointestinal: Negative for abdominal distention, abdominal pain, constipation, diarrhea, nausea and vomiting. Endocrine: Negative for cold intolerance, heat intolerance and polydipsia. Genitourinary: Negative for difficulty urinating, frequency and urgency. Musculoskeletal: Positive for arthralgias and neck pain. Negative for back pain and joint swelling. Skin: Negative for color change, rash and wound. Allergic/Immunologic: Negative for environmental allergies and food allergies. Neurological: Negative for seizures, light-headedness and headaches. Hematological: Negative for adenopathy. Does not bruise/bleed easily. Psychiatric/Behavioral: Negative for confusion, sleep disturbance and suicidal ideas. OBJECTIVE:  /62 (Site: Right Upper Arm, Position: Sitting, Cuff Size: Medium Adult)   Temp 96.8 °F (36 °C) (Temporal)   Ht 5' 10\" (1.778 m)   Wt 196 lb 12.8 oz (89.3 kg)   BMI 28.24 kg/m²   Physical Exam  Vitals reviewed.    Constitutional:       Appearance: She is well-developed. HENT:      Head: Normocephalic and atraumatic. Eyes:      Pupils: Pupils are equal, round, and reactive to light. Cardiovascular:      Rate and Rhythm: Normal rate and regular rhythm. Pulmonary:      Effort: Pulmonary effort is normal.      Breath sounds: Normal breath sounds. No wheezing or rales. Chest:   Breasts:      Right: No inverted nipple. Left: Skin change and tenderness present. No inverted nipple, mass or nipple discharge. Comments: Left breast with mild erythema, no discharge appreciated today. Warm. Healing surgical site with appropriate firmness. Abdominal:      General: Bowel sounds are normal. There is no distension. Palpations: Abdomen is soft. Musculoskeletal:         General: Normal range of motion. Cervical back: Normal range of motion and neck supple. Lymphadenopathy:      Cervical: No cervical adenopathy. Skin:     General: Skin is warm and dry. Neurological:      Mental Status: She is alert and oriented to person, place, and time. Psychiatric:         Behavior: Behavior normal.         Thought Content: Thought content normal.         Judgment: Judgment normal.         ASSESSMENT:   Diagnosis Orders   1. Mastalgia  US BREAST LIMITED LEFT    sulfamethoxazole-trimethoprim (BACTRIM DS;SEPTRA DS) 800-160 MG per tablet   2. Intraductal papilloma  US BREAST LIMITED LEFT   3. Mastitis  sulfamethoxazole-trimethoprim (BACTRIM DS;SEPTRA DS) 800-160 MG per tablet       PLAN:  Orders Placed This Encounter   Procedures    US BREAST LIMITED LEFT     Standing Status:   Future     Standing Expiration Date:   12/1/2022     Order Specific Question:   Reason for exam:     Answer:   redness, pain, drainage from left breast; 1 month s/p IP excision.      Orders Placed This Encounter   Medications    sulfamethoxazole-trimethoprim (BACTRIM DS;SEPTRA DS) 800-160 MG per tablet     Sig: Take 1 tablet by mouth 2 times daily for 7 days     Dispense:  14 tablet Refill:  0     Possible mastitis vs abscess. Will check sonogram. Palpable area of concern feels like typical scar tissue and this was explained to [address] who notes understanding. Will check sonogram to be sure. If fluid/pus present, will plan for aspiration. Return in about 2 weeks (around 12/15/2021).     DO Kvng 84/9/1856 10:58 AM

## 2021-12-08 ENCOUNTER — HOSPITAL ENCOUNTER (OUTPATIENT)
Dept: WOMENS IMAGING | Age: 41
Discharge: HOME | End: 2021-12-08
Payer: MEDICAID

## 2021-12-08 DIAGNOSIS — D36.9 INTRADUCTAL PAPILLOMA: ICD-10-CM

## 2021-12-08 DIAGNOSIS — N64.4 MASTALGIA: ICD-10-CM

## 2021-12-08 PROCEDURE — 76642 ULTRASOUND BREAST LIMITED: CPT

## 2022-01-03 ENCOUNTER — HOSPITAL ENCOUNTER (EMERGENCY)
Age: 42
Discharge: HOME | End: 2022-01-03
Attending: EMERGENCY MEDICINE
Payer: MEDICAID

## 2022-01-03 VITALS
HEIGHT: 71 IN | WEIGHT: 196 LBS | SYSTOLIC BLOOD PRESSURE: 102 MMHG | DIASTOLIC BLOOD PRESSURE: 74 MMHG | HEART RATE: 80 BPM | RESPIRATION RATE: 22 BRPM | OXYGEN SATURATION: 96 % | BODY MASS INDEX: 27.44 KG/M2 | TEMPERATURE: 98.7 F

## 2022-01-03 DIAGNOSIS — U07.1 COVID-19: Primary | ICD-10-CM

## 2022-01-03 DIAGNOSIS — R19.7 NAUSEA VOMITING AND DIARRHEA: ICD-10-CM

## 2022-01-03 DIAGNOSIS — R11.2 NAUSEA VOMITING AND DIARRHEA: ICD-10-CM

## 2022-01-03 LAB
ALBUMIN SERPL-MCNC: 4.5 G/DL (ref 3.5–5.2)
ALP BLD-CCNC: 54 U/L (ref 35–104)
ALT SERPL-CCNC: 27 U/L (ref 5–33)
ANION GAP SERPL CALCULATED.3IONS-SCNC: 15 MMOL/L (ref 7–19)
AST SERPL-CCNC: 17 U/L (ref 5–32)
BASOPHILS ABSOLUTE: 0.1 K/UL (ref 0–0.2)
BASOPHILS RELATIVE PERCENT: 0.7 % (ref 0–1)
BILIRUB SERPL-MCNC: 0.4 MG/DL (ref 0.2–1.2)
BUN BLDV-MCNC: 7 MG/DL (ref 6–20)
CALCIUM SERPL-MCNC: 9.5 MG/DL (ref 8.6–10)
CHLORIDE BLD-SCNC: 104 MMOL/L (ref 98–111)
CO2: 18 MMOL/L (ref 22–29)
CREAT SERPL-MCNC: 0.6 MG/DL (ref 0.5–0.9)
EOSINOPHILS ABSOLUTE: 0 K/UL (ref 0–0.6)
EOSINOPHILS RELATIVE PERCENT: 0.4 % (ref 0–5)
GFR AFRICAN AMERICAN: >59
GFR NON-AFRICAN AMERICAN: >60
GLUCOSE BLD-MCNC: 116 MG/DL (ref 74–109)
HCG QUALITATIVE: NEGATIVE
HCT VFR BLD CALC: 47.5 % (ref 37–47)
HEMOGLOBIN: 15.3 G/DL (ref 12–16)
IMMATURE GRANULOCYTES #: 0.1 K/UL
LIPASE: 19 U/L (ref 13–60)
LYMPHOCYTES ABSOLUTE: 0.3 K/UL (ref 1.1–4.5)
LYMPHOCYTES RELATIVE PERCENT: 2.4 % (ref 20–40)
MCH RBC QN AUTO: 29 PG (ref 27–31)
MCHC RBC AUTO-ENTMCNC: 32.2 G/DL (ref 33–37)
MCV RBC AUTO: 90 FL (ref 81–99)
MONOCYTES ABSOLUTE: 0.8 K/UL (ref 0–0.9)
MONOCYTES RELATIVE PERCENT: 7.3 % (ref 0–10)
NEUTROPHILS ABSOLUTE: 9.2 K/UL (ref 1.5–7.5)
NEUTROPHILS RELATIVE PERCENT: 88.5 % (ref 50–65)
PDW BLD-RTO: 12.8 % (ref 11.5–14.5)
PLATELET # BLD: 235 K/UL (ref 130–400)
PMV BLD AUTO: 10.2 FL (ref 9.4–12.3)
POTASSIUM SERPL-SCNC: 3.5 MMOL/L (ref 3.5–5)
RBC # BLD: 5.28 M/UL (ref 4.2–5.4)
SARS-COV-2, NAAT: DETECTED
SODIUM BLD-SCNC: 137 MMOL/L (ref 136–145)
TOTAL PROTEIN: 7.5 G/DL (ref 6.6–8.7)
WBC # BLD: 10.4 K/UL (ref 4.8–10.8)

## 2022-01-03 PROCEDURE — 96375 TX/PRO/DX INJ NEW DRUG ADDON: CPT

## 2022-01-03 PROCEDURE — 36415 COLL VENOUS BLD VENIPUNCTURE: CPT

## 2022-01-03 PROCEDURE — 96374 THER/PROPH/DIAG INJ IV PUSH: CPT

## 2022-01-03 PROCEDURE — 87635 SARS-COV-2 COVID-19 AMP PRB: CPT

## 2022-01-03 PROCEDURE — 80053 COMPREHEN METABOLIC PANEL: CPT

## 2022-01-03 PROCEDURE — 84703 CHORIONIC GONADOTROPIN ASSAY: CPT

## 2022-01-03 PROCEDURE — 83690 ASSAY OF LIPASE: CPT

## 2022-01-03 PROCEDURE — 6360000002 HC RX W HCPCS: Performed by: EMERGENCY MEDICINE

## 2022-01-03 PROCEDURE — 2580000003 HC RX 258: Performed by: EMERGENCY MEDICINE

## 2022-01-03 PROCEDURE — 85025 COMPLETE CBC W/AUTO DIFF WBC: CPT

## 2022-01-03 PROCEDURE — 99282 EMERGENCY DEPT VISIT SF MDM: CPT

## 2022-01-03 RX ORDER — ONDANSETRON 4 MG/1
4 TABLET, ORALLY DISINTEGRATING ORAL EVERY 8 HOURS PRN
Qty: 15 TABLET | Refills: 0 | Status: SHIPPED | OUTPATIENT
Start: 2022-01-03 | End: 2022-09-22

## 2022-01-03 RX ORDER — ONDANSETRON 2 MG/ML
4 INJECTION INTRAMUSCULAR; INTRAVENOUS ONCE
Status: COMPLETED | OUTPATIENT
Start: 2022-01-03 | End: 2022-01-03

## 2022-01-03 RX ORDER — MORPHINE SULFATE 4 MG/ML
4 INJECTION, SOLUTION INTRAMUSCULAR; INTRAVENOUS ONCE
Status: COMPLETED | OUTPATIENT
Start: 2022-01-03 | End: 2022-01-03

## 2022-01-03 RX ORDER — 0.9 % SODIUM CHLORIDE 0.9 %
1000 INTRAVENOUS SOLUTION INTRAVENOUS ONCE
Status: COMPLETED | OUTPATIENT
Start: 2022-01-03 | End: 2022-01-03

## 2022-01-03 RX ADMIN — ONDANSETRON 4 MG: 2 INJECTION INTRAMUSCULAR; INTRAVENOUS at 21:16

## 2022-01-03 RX ADMIN — MORPHINE SULFATE 4 MG: 4 INJECTION INTRAVENOUS at 21:16

## 2022-01-03 RX ADMIN — SODIUM CHLORIDE 1000 ML: 9 INJECTION, SOLUTION INTRAVENOUS at 21:16

## 2022-01-03 ASSESSMENT — PAIN SCALES - GENERAL
PAINLEVEL_OUTOF10: 8
PAINLEVEL_OUTOF10: 9

## 2022-01-03 ASSESSMENT — PAIN DESCRIPTION - LOCATION: LOCATION: HEAD

## 2022-01-03 ASSESSMENT — ENCOUNTER SYMPTOMS
SHORTNESS OF BREATH: 0
RHINORRHEA: 0
ABDOMINAL PAIN: 0
DIARRHEA: 1
SORE THROAT: 0
COUGH: 0
NAUSEA: 1
VOMITING: 1
BACK PAIN: 0

## 2022-01-03 ASSESSMENT — PAIN DESCRIPTION - PAIN TYPE: TYPE: ACUTE PAIN

## 2022-01-04 ENCOUNTER — CARE COORDINATION (OUTPATIENT)
Dept: CARE COORDINATION | Age: 42
End: 2022-01-04

## 2022-01-04 NOTE — CARE COORDINATION
ACM unable to speak with patient this morning, no voicemail. Left HIPAA compliant message on mother's phone to ask pt to call me.   Electronically signed by Ramona Hendrickson RN on 1/4/2022 at 10:43 AM

## 2022-01-04 NOTE — ED PROVIDER NOTES
[facility] EMERGENCY DEPT  eMERGENCY dEPARTMENT eNCOUnter      Pt Name: Go Toribio  MRN: [id number]  [place] 1980  Date of evaluation: 1/3/2022  Provider: Rocio Wilson MD    [address] [place] [address]       Chief Complaint   Patient presents with    Emesis     started about 5 am    Headache    Chills         HISTORY OF PRESENT ILLNESS   (Location/Symptom, Timing/Onset,Context/Setting, Quality, Duration, Modifying Factors, Severity)  Note limiting factors. Go Toribio is a 35 y.o. female who presents to the emergency department for acute onset around 5 AM of multiple episodes of nonbloody but bilious appearing emesis and watery diarrhea. Denies any localized abdominal pain admits to some mild crampy type discomfort. He also reports a frontal squeezing type tension headache. Admits to chills but denies any fevers. No cough or upper respiratory symptoms. No myalgias. No loss of taste or smell. In addition patient reports that she had left breast excision in october and thinks it could be infected as has had some drainage. HPI    NursingNotes were reviewed. REVIEW OF SYSTEMS    (2-9 systems for level 4, 10 or more for level 5)     Review of Systems   Constitutional: Positive for activity change, appetite change and chills. Negative for fever. HENT: Negative for rhinorrhea and sore throat. Respiratory: Negative for cough and shortness of breath. Cardiovascular: Negative for chest pain and leg swelling. Gastrointestinal: Positive for diarrhea, nausea and vomiting. Negative for abdominal pain. Genitourinary: Negative for dysuria, frequency and urgency. Musculoskeletal: Negative for back pain and neck pain. Neurological: Positive for headaches. Negative for dizziness. All other systems reviewed and are negative.            PAST MEDICALHISTORY     Past Medical History:   Diagnosis Date    Asthma     childhood; \"grew out of it\"    Breast mass 03/2020    left     Hyperthyroidism no treatment, checked every 6 months         SURGICAL HISTORY       Past Surgical History:   Procedure Laterality Date    BREAST SURGERY Left 10/11/2021    EXCISION LEFT BREAST BIOPSY AND POSS DUCT EXCISION PEC BLOCK performed by Jocelyne Castaneda DO at [facility] Right 2020    RIGHT SHOULDER MANIPULATION UNDE ANESTHESIA performed by [facility] at [facility]      HC INJECT OTHER PERPHRL NERV Right 2020    CORTICOSTEROID INJECTION performed by Carlos Ro MD at [place] [address].     Discharge Medication List as of 1/3/2022 10:18 PM          ALLERGIES     Mushroom extract complex    FAMILY HISTORY       Family History   Problem Relation Age of Onset    Lung Cancer Father     Cancer Father         lung cancer          SOCIAL HISTORY       Social History     Socioeconomic History    Marital status: Single     Spouse name: Not on file    Number of children: Not on file    Years of education: Not on file    Highest education level: Not on file   Occupational History    Not on file   Tobacco Use    Smoking status: Former Smoker     Packs/day: 0.25     Years: 30.00     Pack years: 7.50     Types: Cigarettes     Quit date: 10/8/2021     Years since quittin.2    Smokeless tobacco: Never Used   Vaping Use    Vaping Use: Never used   Substance and Sexual Activity    Alcohol use: Not Currently    Drug use: Yes     Types: Marijuana [place])     Comment: occ. (last 10/9/2021)    Sexual activity: Yes     Partners: Male   Other Topics Concern    Not on file   Social History Narrative    Not on file     Social Determinants of Health     Financial Resource Strain:     Difficulty of Paying Living Expenses: Not on file   Food Insecurity:     Worried About [address] in the Last Year: Not on file    [address] in the Last Year: Not on file   Transportation Needs:     Lack of Transportation (Medical): Not on file    Lack of Transportation (Non-Medical): Not on file   Physical Activity:     Days of Exercise per Week: Not on file    Minutes of Exercise per Session: Not on file   Stress:     Feeling of Stress : Not on file   Social Connections:     Frequency of Communication with Friends and Family: Not on file    Frequency of Social Gatherings with Friends and Family: Not on file    Attends Protestant Services: Not on file    Active Member of [address] [place] [address] [employer] or Organizations: Not on file    Attends Club or Organization Meetings: Not on file    Marital Status: Not on file   Intimate Partner Violence:     Fear of Current or Ex-Partner: Not on file    Emotionally Abused: Not on file    Physically Abused: Not on file    Sexually Abused: Not on file   Housing Stability:     Unable to Pay for Housing in the Last Year: Not on file    Number of [place] in the Last Year: Not on file    Unstable Housing in the Last Year: Not on file       SCREENINGS             PHYSICAL EXAM    (up to 7 for level 4, 8 or more for level 5)     ED Triage Vitals [01/03/22 1945]   BP Temp Temp Source Pulse Resp SpO2 Height Weight   102/74 98.7 °F (37.1 °C) Oral 80 22 96 % 5' 11\" (1.803 m) 196 lb (88.9 kg)       Physical Exam  Vitals and nursing note reviewed. Exam conducted with a chaperone present. Constitutional:       General: She is not in acute distress. Appearance: Normal appearance. She is well-developed. She is ill-appearing. She is not diaphoretic. HENT:      Head: Normocephalic and atraumatic. Right Ear: External ear normal.      Left Ear: External ear normal.   Eyes:      Conjunctiva/sclera: Conjunctivae normal.   Neck:      Trachea: No tracheal deviation. Cardiovascular:      Rate and Rhythm: Normal rate and regular rhythm. Heart sounds: Normal heart sounds. No murmur heard. Pulmonary:      Effort: No respiratory distress. Breath sounds: Normal breath sounds. No wheezing or rales. Chest:          Comments: approx 0.5cm area oval shaped scabbed wound no erythema or fluctuance, no drainage  Abdominal:      Palpations: Abdomen is soft. There is no mass. Tenderness: There is no abdominal tenderness. Musculoskeletal:         General: Normal range of motion. Cervical back: Normal range of motion. Skin:     General: Skin is warm and dry. Neurological:      General: No focal deficit present. Mental Status: She is alert and oriented to person, place, and time. GCS: GCS eye subscore is 4. GCS verbal subscore is 5. GCS motor subscore is 6. Cranial Nerves: No dysarthria or facial asymmetry. Motor: No weakness or abnormal muscle tone. DIAGNOSTIC RESULTS           No orders to display           LABS:  Labs Reviewed   COVID-19, RAPID - Abnormal; Notable for the following components:       Result Value    SARS-CoV-2, NAAT DETECTED (*)     All other components within normal limits    Narrative:     CALL  [place]  [facility] tel. ,  [facility] [place] 21:[place] [facility]   CBC WITH AUTO DIFFERENTIAL - Abnormal; Notable for the following components:    Hematocrit 47.5 (*)     MCHC 32.2 (*)     Neutrophils % 88.5 (*)     Lymphocytes % 2.4 (*)     Neutrophils Absolute 9.2 (*)     Lymphocytes Absolute 0.3 (*)     All other components within normal limits   COMPREHENSIVE METABOLIC PANEL - Abnormal; Notable for the following components:    CO2 18 (*)     Glucose 116 (*)     All other components within normal limits   LIPASE   HCG, SERUM, QUALITATIVE       All other labs were within normal range or not returned as of this dictation.     EMERGENCY DEPARTMENT COURSE and DIFFERENTIAL DIAGNOSIS/MDM:   Vitals:    Vitals:    01/03/22 1945   BP: 102/74   Pulse: 80   Resp: 22   Temp: 98.7 °F (37.1 °C)   TempSrc: Oral   SpO2: 96%   Weight: 196 lb (88.9 kg)   Height: 5' 11\" (1.803 m)       MDM    Acute onset of symptoms today, mostly GI is her concern, labs reassuring, sierra PO, covid +, discussed return precautions    CONSULTS:  None    PROCEDURES:  Unless otherwise noted below, none     Procedures    FINAL IMPRESSION      1. COVID-19    2.  Nausea vomiting and diarrhea          DISPOSITION/PLAN   DISPOSITION Decision To Discharge 01/03/2022 10:02:13 PM      PATIENT REFERRED TO:  [facility] EMERGENCY DEPT  Varun Kymberly  [phone]    As needed, If symptoms worsen      DISCHARGE MEDICATIONS:  Discharge Medication List as of 1/3/2022 10:18 PM      START taking these medications    Details   ondansetron (ZOFRAN ODT) 4 MG disintegrating tablet Take 1 tablet by mouth every 8 hours as needed for Nausea or Vomiting, Disp-15 tablet, R-0Normal                (Please note that portions of this note were completed with a voice recognition program.  Efforts were made to edit thedictations but occasionally words are mis-transcribed.)    Kamilah Mcfarlane MD (electronically signed)  Attending Emergency Physician        Magalys Fry MD  01/04/22 [id number]

## 2022-01-04 NOTE — ED TRIAGE NOTES
Patient states that she has a \"massive headache and can not stop throwing up\", patient states it started around 5 am.

## 2022-01-05 ENCOUNTER — CARE COORDINATION (OUTPATIENT)
Dept: CARE COORDINATION | Age: 42
End: 2022-01-05

## 2022-01-05 NOTE — CARE COORDINATION
ACM attempted again today to speak with patient re: ED visit 1/3/22 for Care Transitions. Phone recording stated pt is \". ..not available\", and no voicemail option. ACM placed call to other contact listed, pt's mother. ACM left HIPAA compliant voicemail requesting the mother ask pt to call me back.   Electronically signed by Isac Tvaera RN on 1/5/2022 at 9:38 AM

## 2022-04-19 ENCOUNTER — HOSPITAL ENCOUNTER (OUTPATIENT)
Dept: WOMENS IMAGING | Age: 42
Discharge: HOME | End: 2022-04-19
Payer: MEDICAID

## 2022-04-19 DIAGNOSIS — D24.2 INTRADUCTAL PAPILLOMA OF BREAST, LEFT: ICD-10-CM

## 2022-04-19 PROCEDURE — G0279 TOMOSYNTHESIS, MAMMO: HCPCS

## 2022-04-28 ENCOUNTER — OFFICE VISIT (OUTPATIENT)
Dept: SURGERY | Age: 42
End: 2022-04-28
Payer: MEDICAID

## 2022-04-28 VITALS
SYSTOLIC BLOOD PRESSURE: 124 MMHG | WEIGHT: 193 LBS | DIASTOLIC BLOOD PRESSURE: 70 MMHG | BODY MASS INDEX: 28.58 KG/M2 | HEIGHT: 69 IN | TEMPERATURE: 97.7 F

## 2022-04-28 DIAGNOSIS — D24.2 INTRADUCTAL PAPILLOMA OF LEFT BREAST: Primary | ICD-10-CM

## 2022-04-28 DIAGNOSIS — Z12.31 BREAST CANCER SCREENING BY MAMMOGRAM: ICD-10-CM

## 2022-04-28 PROBLEM — N61.1 LEFT BREAST ABSCESS: Status: ACTIVE | Noted: 2021-09-28

## 2022-04-28 PROCEDURE — 1036F TOBACCO NON-USER: CPT | Performed by: SURGERY

## 2022-04-28 PROCEDURE — 99213 OFFICE O/P EST LOW 20 MIN: CPT | Performed by: SURGERY

## 2022-04-28 PROCEDURE — G8419 CALC BMI OUT NRM PARAM NOF/U: HCPCS | Performed by: SURGERY

## 2022-04-28 PROCEDURE — G8427 DOCREV CUR MEDS BY ELIG CLIN: HCPCS | Performed by: SURGERY

## 2022-04-28 ASSESSMENT — ENCOUNTER SYMPTOMS
ABDOMINAL DISTENTION: 0
VOMITING: 0
EYE PAIN: 0
DIARRHEA: 0
COUGH: 1
BACK PAIN: 0
SORE THROAT: 0
CONSTIPATION: 0
NAUSEA: 0
SHORTNESS OF BREATH: 0
ABDOMINAL PAIN: 0
COLOR CHANGE: 0
EYE REDNESS: 0
CHEST TIGHTNESS: 0

## 2022-04-28 NOTE — PROGRESS NOTES
SUBJECTIVE:  Ms. Ethan Gracia is a 35 y.o. female who presents today in follow up for a left breast intraductal papilloma that was removed in October 2021. She is doing well. She notes no pain. Patient's medications, allergies, past medical, surgical, social and family histories werereviewed and updated as appropriate. Review of Systems   Constitutional: Negative for fatigue, fever and unexpected weight change. HENT: Negative for hearing loss, nosebleeds and sore throat. Eyes: Negative for pain, redness and visual disturbance. Respiratory: Positive for cough. Negative for chest tightness and shortness of breath. Cardiovascular: Negative for chest pain, palpitations and leg swelling. Gastrointestinal: Negative for abdominal distention, abdominal pain, constipation, diarrhea, nausea and vomiting. Endocrine: Negative for cold intolerance, heat intolerance and polydipsia. Genitourinary: Negative for difficulty urinating, frequency and urgency. Musculoskeletal: Positive for arthralgias and neck pain. Negative for back pain and joint swelling. Skin: Negative for color change, rash and wound. Allergic/Immunologic: Negative for environmental allergies and food allergies. Neurological: Negative for seizures, light-headedness and headaches. Hematological: Negative for adenopathy. Does not bruise/bleed easily. Psychiatric/Behavioral: Negative for confusion, sleep disturbance and suicidal ideas. OBJECTIVE:  /70 (Site: Right Upper Arm, Position: Sitting, Cuff Size: Medium Adult)   Temp 97.7 °F (36.5 °C) (Temporal)   Ht 5' 9\" (1.753 m)   Wt 193 lb (87.5 kg)   BMI 28.50 kg/m²   Physical Exam  Vitals reviewed. Constitutional:       Appearance: She is well-developed. HENT:      Head: Normocephalic and atraumatic. Eyes:      Pupils: Pupils are equal, round, and reactive to light. Cardiovascular:      Rate and Rhythm: Normal rate and regular rhythm.       Heart sounds: Normal heart sounds. Pulmonary:      Effort: Pulmonary effort is normal.      Breath sounds: Normal breath sounds. No wheezing or rales. Chest:   Breasts:      Right: Normal. No inverted nipple, mass, nipple discharge, skin change, tenderness, axillary adenopathy or supraclavicular adenopathy. Left: No inverted nipple, mass, nipple discharge, skin change, tenderness, axillary adenopathy or supraclavicular adenopathy. Abdominal:      General: Bowel sounds are normal. There is no distension. Palpations: Abdomen is soft. Tenderness: There is no abdominal tenderness. There is no guarding or rebound. Musculoskeletal:         General: Normal range of motion. Cervical back: Normal range of motion and neck supple. Lymphadenopathy:      Cervical: No cervical adenopathy. Upper Body:      Right upper body: No supraclavicular or axillary adenopathy. Left upper body: No supraclavicular or axillary adenopathy. Skin:     General: Skin is warm and dry. Neurological:      Mental Status: She is alert and oriented to person, place, and time. Deep Tendon Reflexes: Reflexes are normal and symmetric. Psychiatric:         Behavior: Behavior normal.         Thought Content: Thought content normal.         Judgment: Judgment normal.     12/8/2022 US Left       Impression   FINDINGS/IMPRESSION:   Patient is status post excision of a papilloma within the LEFT nipple. At the site of prior mass, a small surgical clip is noted. There is   some mild surrounding soft tissue edema present. No residual soft   tissue mass identified. No fluid collection to suggest an abscess. Recommend continued clinical follow-up. BI-RADS CATEGORY 2: BENIGN FINDINGS.    Signed by Dr Zoya Mccarty         4/19/2022 Left Diagnostic Mammogram   Narrative   [facility] DIGITAL DIAGNOSTIC UNILATERAL LEFT 4/19/2022 9:59 AM   HISTORY: D24.2   COMPARISON: Exam is dated 9/20/2021 and 9/6/2019   FINDINGS:   CC and MLO views of the left breast were obtained, 2D and   Tomosynthesis. The breast tissue is heterogeneously dense (approximately 50-75%   glandular tissue), limiting the sensitivity of mammography. Upper   anterior left breast scar marker. No suspicious masses, architectural   distortion or suspicious microcalcifications are identified. This   study was interpreted with CAD. IMPRESSION AND RECOMMENDATION:    No mammographic evidence of malignancy. Recommendation is for the   patient to return to routine mammography in 6 months or sooner, if   clinically indicated. BI-RADS CATEGORY 2: BENIGN FINDINGS   The patient's information has been added to a reminder system with a   target due date for the next mammogram.   Signed by Dr Remi Ann           ASSESSMENT:   Diagnosis Orders   1. Intraductal papilloma of left breast  [facility] DIGITAL DIAGNOSTIC W OR WO CAD BILATERAL   2. Breast cancer screening by mammogram  [facility] DIGITAL DIAGNOSTIC W OR WO CAD BILATERAL       PLAN:  Orders Placed This Encounter   Procedures    [facility] DIGITAL DIAGNOSTIC W OR WO CAD BILATERAL     Standing Status:   Future     Standing Expiration Date:   6/28/2023     No orders of the defined types were placed in this encounter. Discussed benign findings on today's exam that are consistent with imaging. Recommend continued yearly screening mammography in Sept of 2022. Return in about 6 months (around 10/28/2022).     DO Kvng 8/62/6622 11:03 AM

## 2022-08-10 ENCOUNTER — TELEPHONE (OUTPATIENT)
Dept: SURGERY | Age: 42
End: 2022-08-10

## 2022-08-10 NOTE — TELEPHONE ENCOUNTER
Called patient and gave her the following appt information:    Patient scheduled for mammogram on 9/16/22 at 9:30, she will see Dr. Edin Seals on 9/22 at 10:00 to go over the results.

## 2022-09-16 ENCOUNTER — HOSPITAL ENCOUNTER (OUTPATIENT)
Dept: WOMENS IMAGING | Age: 42
Discharge: HOME | End: 2022-09-16
Payer: MEDICAID

## 2022-09-16 DIAGNOSIS — D24.2 INTRADUCTAL PAPILLOMA OF LEFT BREAST: ICD-10-CM

## 2022-09-16 DIAGNOSIS — Z12.31 BREAST CANCER SCREENING BY MAMMOGRAM: ICD-10-CM

## 2022-09-16 PROCEDURE — 77063 BREAST TOMOSYNTHESIS BI: CPT

## 2022-09-22 ENCOUNTER — OFFICE VISIT (OUTPATIENT)
Dept: SURGERY | Age: 42
End: 2022-09-22
Payer: MEDICAID

## 2022-09-22 VITALS
HEART RATE: 76 BPM | HEIGHT: 69 IN | TEMPERATURE: 97.9 F | WEIGHT: 195 LBS | BODY MASS INDEX: 28.88 KG/M2 | OXYGEN SATURATION: 97 %

## 2022-09-22 DIAGNOSIS — Z12.31 SCREENING MAMMOGRAM, ENCOUNTER FOR: ICD-10-CM

## 2022-09-22 DIAGNOSIS — D24.2 INTRADUCTAL PAPILLOMA OF LEFT BREAST: Primary | ICD-10-CM

## 2022-09-22 PROCEDURE — G8427 DOCREV CUR MEDS BY ELIG CLIN: HCPCS | Performed by: SURGERY

## 2022-09-22 PROCEDURE — 1036F TOBACCO NON-USER: CPT | Performed by: SURGERY

## 2022-09-22 PROCEDURE — G8419 CALC BMI OUT NRM PARAM NOF/U: HCPCS | Performed by: SURGERY

## 2022-09-22 PROCEDURE — 99213 OFFICE O/P EST LOW 20 MIN: CPT | Performed by: SURGERY

## 2022-09-22 ASSESSMENT — ENCOUNTER SYMPTOMS
COUGH: 1
SORE THROAT: 0
BACK PAIN: 0
CHEST TIGHTNESS: 0
CONSTIPATION: 0
VOMITING: 0
ABDOMINAL DISTENTION: 0
NAUSEA: 0
EYE PAIN: 0
SHORTNESS OF BREATH: 0
COLOR CHANGE: 0
ABDOMINAL PAIN: 0
EYE REDNESS: 0
DIARRHEA: 0

## 2022-09-22 NOTE — PROGRESS NOTES
SUBJECTIVE:  Ms. Racquel Ochoa is a 38 y.o. female who presents today for yearly exam. She notes she has an area on her incision from her IP excision that has a stitch that is irritating. She notes she tried to pull it out the other day and this hurt, but she isn't sure if she got the stitch. No further nipple discharge. Patient's medications, allergies, past medical, surgical, social and family histories werereviewed and updated as appropriate. Review of Systems   Constitutional:  Negative for fatigue, fever and unexpected weight change. HENT:  Negative for hearing loss, nosebleeds and sore throat. Eyes:  Negative for pain, redness and visual disturbance. Respiratory:  Positive for cough. Negative for chest tightness and shortness of breath. Cardiovascular:  Negative for chest pain, palpitations and leg swelling. Gastrointestinal:  Negative for abdominal distention, abdominal pain, constipation, diarrhea, nausea and vomiting. Endocrine: Negative for cold intolerance, heat intolerance and polydipsia. Genitourinary:  Negative for difficulty urinating, frequency and urgency. Musculoskeletal:  Positive for arthralgias and neck pain. Negative for back pain and joint swelling. Skin:  Negative for color change, rash and wound. Allergic/Immunologic: Negative for environmental allergies and food allergies. Neurological:  Negative for seizures, light-headedness and headaches. Hematological:  Negative for adenopathy. Does not bruise/bleed easily. Psychiatric/Behavioral:  Negative for confusion, sleep disturbance and suicidal ideas. OBJECTIVE:  Pulse 76   Temp 97.9 °F (36.6 °C) (Temporal)   Ht 5' 9\" (1.753 m)   Wt 195 lb (88.5 kg)   SpO2 97%   BMI 28.80 kg/m²   Physical Exam  Vitals reviewed. Exam conducted with a chaperone present. Constitutional:       Appearance: She is well-developed. HENT:      Head: Normocephalic and atraumatic.    Eyes:      Pupils: Pupils are equal, round, and reactive to light. Cardiovascular:      Rate and Rhythm: Normal rate and regular rhythm. Pulmonary:      Effort: Pulmonary effort is normal.      Breath sounds: Normal breath sounds. No wheezing or rales. Chest:   Breasts:     Right: Normal. No inverted nipple, mass, nipple discharge, skin change, tenderness, axillary adenopathy or supraclavicular adenopathy. Left: Normal. No inverted nipple, mass, nipple discharge, skin change, tenderness, axillary adenopathy or supraclavicular adenopathy. Comments: No clear palpable masses. Small scab to left surgical incision. No clear stitch to remove. Abdominal:      General: Bowel sounds are normal. There is no distension. Palpations: Abdomen is soft. Musculoskeletal:         General: Normal range of motion. Cervical back: Normal range of motion and neck supple. Lymphadenopathy:      Cervical: No cervical adenopathy. Upper Body:      Right upper body: No supraclavicular or axillary adenopathy. Left upper body: No supraclavicular or axillary adenopathy. Skin:     General: Skin is warm and dry. Neurological:      Mental Status: She is alert and oriented to person, place, and time. Psychiatric:         Behavior: Behavior normal.         Thought Content: Thought content normal.         Judgment: Judgment normal.       9/16/2022 Bilateral Screening Mammogram       Impression   Impression:   Bilateral breast, BI-RADS 2, benign. Recommendation is annual   screening mammography. Overall assessment BI-RADS 2, Benign. Signed by Dr Jones Level             ASSESSMENT:   Diagnosis Orders   1. Intraductal papilloma of left breast        2.  Screening mammogram, encounter for  [facility] DIGITAL SCREEN W OR WO CAD BILATERAL      PLAN:  Orders Placed This Encounter   Procedures    [facility] DIGITAL SCREEN W OR WO CAD BILATERAL     Standing Status:   Future     Standing Expiration Date:   11/22/2023     No orders of the defined types were placed in this encounter. Discussed benign findings on today's exam that are consistent with imaging. Recommend continued yearly screening mammography in September of 2023. Return in about 1 year (around 9/22/2023).     Shan Mas DO 0/72/0015 10:20 AM

## 2023-08-23 ENCOUNTER — TELEPHONE (OUTPATIENT)
Dept: SURGERY | Age: 43
End: 2023-08-23

## 2023-08-23 NOTE — TELEPHONE ENCOUNTER
I s/w patient today & informed her of the following appts[facility] & then appt at our office to see Alma See on 10/18/23 @ 1:15pm. Pt said these appts work for her & thanks.

## 2023-10-16 ENCOUNTER — HOSPITAL ENCOUNTER (OUTPATIENT)
Dept: WOMENS IMAGING | Age: 43
Discharge: HOME | End: 2023-10-16
Attending: SURGERY
Payer: MEDICAID

## 2023-10-16 DIAGNOSIS — Z12.31 SCREENING MAMMOGRAM, ENCOUNTER FOR: ICD-10-CM

## 2023-10-16 PROCEDURE — 77063 BREAST TOMOSYNTHESIS BI: CPT

## 2023-10-17 NOTE — PROGRESS NOTES
Farrah Dominguez comes today for her follow-up breast exam.  She has had left nipple duct excision by Dr. Rebecca Roe that demonstrated an intraductal papilloma. Since that time, she reports some intermittent problems with the nipple incision that has showed some drainage. The drainage will spontaneously drain and then recur. Patient Active Problem List    Diagnosis Date Noted    Adhesive capsulitis of right shoulder 01/22/2020    Irregular menses 08/20/2019       No current outpatient medications on file. No current facility-administered medications for this visit.        Allergies Mushroom extract complex    Past Medical History:   Diagnosis Date    Asthma     childhood; \"grew out of it\"    Hyperthyroidism     no treatment, checked every 6 months       Past Surgical History:   Procedure Laterality Date    BREAST BIOPSY Left 2021    benign    BREAST SURGERY Left 10/11/2021    EXCISION LEFT BREAST BIOPSY AND POSS DUCT EXCISION PEC BLOCK performed by Clyde Agarwal DO at [facility] Right 1/23/2020    RIGHT SHOULDER MANIPULATION UNDE ANESTHESIA performed by Derick Fernandez MD at [address] [facility] Right 1/23/2020    CORTICOSTEROID INJECTION performed by Derick Fernandez MD at [address]         Family History   Problem Relation Age of Onset    Stroke Mother     Lung Cancer Father     Cancer Father         lung cancer       Social History     Tobacco Use    Smoking status: Every Day     Packs/day: .25     Types: Cigarettes    Smokeless tobacco: Never   Substance Use Topics    Alcohol use: Not Currently          ROS  review of system reviewed and positive for the above all other systems noted to be negative    Mammogram  EXAM: BILATERAL DIGITAL SCREENING MAMMOGRAM.     TECHNIQUE: Bilateral low dose digital CC and MLO images were performed with and without

## 2023-10-18 ENCOUNTER — OFFICE VISIT (OUTPATIENT)
Dept: SURGERY | Age: 43
End: 2023-10-18
Payer: MEDICAID

## 2023-10-18 VITALS
HEIGHT: 69 IN | DIASTOLIC BLOOD PRESSURE: 70 MMHG | WEIGHT: 172 LBS | BODY MASS INDEX: 25.48 KG/M2 | SYSTOLIC BLOOD PRESSURE: 120 MMHG

## 2023-10-18 DIAGNOSIS — Z12.31 VISIT FOR SCREENING MAMMOGRAM: Primary | ICD-10-CM

## 2023-10-18 PROCEDURE — G8484 FLU IMMUNIZE NO ADMIN: HCPCS | Performed by: PHYSICIAN ASSISTANT

## 2023-10-18 PROCEDURE — G8427 DOCREV CUR MEDS BY ELIG CLIN: HCPCS | Performed by: PHYSICIAN ASSISTANT

## 2023-10-18 PROCEDURE — 4004F PT TOBACCO SCREEN RCVD TLK: CPT | Performed by: PHYSICIAN ASSISTANT

## 2023-10-18 PROCEDURE — G8419 CALC BMI OUT NRM PARAM NOF/U: HCPCS | Performed by: PHYSICIAN ASSISTANT

## 2023-10-18 PROCEDURE — 99213 OFFICE O/P EST LOW 20 MIN: CPT | Performed by: PHYSICIAN ASSISTANT

## 2024-08-06 ENCOUNTER — HOSPITAL ENCOUNTER (EMERGENCY)
Age: 44
Discharge: HOME | End: 2024-08-07
Payer: MEDICAID

## 2024-08-06 VITALS
TEMPERATURE: 97.5 F | BODY MASS INDEX: 24.37 KG/M2 | HEART RATE: 75 BPM | WEIGHT: 165 LBS | SYSTOLIC BLOOD PRESSURE: 123 MMHG | DIASTOLIC BLOOD PRESSURE: 95 MMHG | OXYGEN SATURATION: 99 % | RESPIRATION RATE: 17 BRPM

## 2024-08-06 DIAGNOSIS — U07.1 COVID-19: Primary | ICD-10-CM

## 2024-08-06 DIAGNOSIS — H65.03 NON-RECURRENT ACUTE SEROUS OTITIS MEDIA OF BOTH EARS: ICD-10-CM

## 2024-08-06 PROCEDURE — 99284 EMERGENCY DEPT VISIT MOD MDM: CPT

## 2024-08-06 RX ORDER — KETOROLAC TROMETHAMINE 30 MG/ML
60 INJECTION, SOLUTION INTRAMUSCULAR; INTRAVENOUS ONCE
Status: COMPLETED | OUTPATIENT
Start: 2024-08-06 | End: 2024-08-07

## 2024-08-07 LAB
B PARAP IS1001 DNA NPH QL NAA+NON-PROBE: NOT DETECTED
B PERT.PT PRMT NPH QL NAA+NON-PROBE: NOT DETECTED
C PNEUM DNA NPH QL NAA+NON-PROBE: NOT DETECTED
FLUAV RNA NPH QL NAA+NON-PROBE: NOT DETECTED
FLUBV RNA NPH QL NAA+NON-PROBE: NOT DETECTED
HADV DNA NPH QL NAA+NON-PROBE: NOT DETECTED
HCOV 229E RNA NPH QL NAA+NON-PROBE: NOT DETECTED
HCOV HKU1 RNA NPH QL NAA+NON-PROBE: NOT DETECTED
HCOV NL63 RNA NPH QL NAA+NON-PROBE: NOT DETECTED
HCOV OC43 RNA NPH QL NAA+NON-PROBE: NOT DETECTED
HMPV RNA NPH QL NAA+NON-PROBE: NOT DETECTED
HPIV1 RNA NPH QL NAA+NON-PROBE: NOT DETECTED
HPIV2 RNA NPH QL NAA+NON-PROBE: NOT DETECTED
HPIV3 RNA NPH QL NAA+NON-PROBE: NOT DETECTED
HPIV4 RNA NPH QL NAA+NON-PROBE: NOT DETECTED
M PNEUMO DNA NPH QL NAA+NON-PROBE: NOT DETECTED
RSV RNA NPH QL NAA+NON-PROBE: NOT DETECTED
RV+EV RNA NPH QL NAA+NON-PROBE: NOT DETECTED
S PYO AG THROAT QL: NEGATIVE
SARS-COV-2 RNA NPH QL NAA+NON-PROBE: DETECTED

## 2024-08-07 PROCEDURE — 6360000002 HC RX W HCPCS: Performed by: NURSE PRACTITIONER

## 2024-08-07 PROCEDURE — 87081 CULTURE SCREEN ONLY: CPT

## 2024-08-07 PROCEDURE — 96372 THER/PROPH/DIAG INJ SC/IM: CPT

## 2024-08-07 PROCEDURE — 0202U NFCT DS 22 TRGT SARS-COV-2: CPT

## 2024-08-07 PROCEDURE — 87880 STREP A ASSAY W/OPTIC: CPT

## 2024-08-07 RX ORDER — AMOXICILLIN 500 MG/1
500 CAPSULE ORAL 3 TIMES DAILY
Qty: 30 CAPSULE | Refills: 0 | Status: SHIPPED | OUTPATIENT
Start: 2024-08-07 | End: 2024-08-17

## 2024-08-07 RX ADMIN — KETOROLAC TROMETHAMINE 60 MG: 30 INJECTION, SOLUTION INTRAMUSCULAR at 00:07

## 2024-08-07 ASSESSMENT — PAIN SCALES - GENERAL: PAINLEVEL_OUTOF10: 10

## 2024-08-07 ASSESSMENT — ENCOUNTER SYMPTOMS
SORE THROAT: 1
RHINORRHEA: 1
COUGH: 1

## 2024-08-07 NOTE — ED PROVIDER NOTES
[facility] EMERGENCY DEPT  eMERGENCY dEPARTMENT eNCOUnter      Pt Name: Marie Padilla  MRN: [id number]  Birthdate 1980  Date of evaluation: 8/6/2024  Provider: CHELSEA Baker    CHIEF COMPLAINT       Chief Complaint   Patient presents with    Nasal Congestion     Pt states has been sick for about 2 weeks with congestion, head fullness, ears feel full         HISTORY OF PRESENT ILLNESS   (Location/Symptom, Timing/Onset,Context/Setting, Quality, Duration, Modifying Factors, Severity)  Note limiting factors.   Marie Padilla is a 43 y.o. female who presents to the emergency department with URI symptoms x 2 weeks.  Ears are hurting especially the left one.  + sore throat  + productive cough    The history is provided by the patient.   URI  Presenting symptoms: congestion, cough, ear pain, fever, rhinorrhea and sore throat    Onset quality:  Sudden  Duration:  2 weeks  Timing:  Constant  Progression:  Unchanged  Chronicity:  New      NursingNotes were reviewed.    REVIEW OF SYSTEMS    (2-9 systems for level 4, 10 or more for level 5)     Review of Systems   Constitutional:  Positive for fever.   HENT:  Positive for congestion, ear pain, rhinorrhea and sore throat.    Respiratory:  Positive for cough.        Except as noted above the remainder of the review of systems was reviewed and negative.       PAST MEDICAL HISTORY     Past Medical History:   Diagnosis Date    Asthma     childhood; \"grew out of it\"    Hyperthyroidism     no treatment, checked every 6 months         SURGICALHISTORY       Past Surgical History:   Procedure Laterality Date    BREAST BIOPSY Left 2021    benign    BREAST SURGERY Left 10/11/2021    EXCISION LEFT BREAST BIOPSY AND POSS DUCT EXCISION PEC BLOCK performed by Elly Burks DO at [facility] OR    CHOLECYSTECTOMY      CLOSED MANIPULATION SHOULDER Right 1/23/2020    RIGHT SHOULDER MANIPULATION UNDE ANESTHESIA performed by Scott Webber MD at [facility] OR    COLONOSCOPY      GALLBLADDER SURGERY

## 2024-08-09 LAB — S PYO THROAT QL CULT: NORMAL

## 2024-09-04 ENCOUNTER — OFFICE VISIT (OUTPATIENT)
Dept: ENT CLINIC | Age: 44
End: 2024-09-04
Payer: MEDICAID

## 2024-09-04 VITALS
BODY MASS INDEX: 26.51 KG/M2 | DIASTOLIC BLOOD PRESSURE: 76 MMHG | HEIGHT: 69 IN | SYSTOLIC BLOOD PRESSURE: 118 MMHG | WEIGHT: 179 LBS

## 2024-09-04 DIAGNOSIS — H69.92 EUSTACHIAN TUBE DYSFUNCTION, LEFT: Primary | ICD-10-CM

## 2024-09-04 PROCEDURE — 1036F TOBACCO NON-USER: CPT | Performed by: PHYSICIAN ASSISTANT

## 2024-09-04 PROCEDURE — G8427 DOCREV CUR MEDS BY ELIG CLIN: HCPCS | Performed by: PHYSICIAN ASSISTANT

## 2024-09-04 PROCEDURE — 99203 OFFICE O/P NEW LOW 30 MIN: CPT | Performed by: PHYSICIAN ASSISTANT

## 2024-09-04 PROCEDURE — G8419 CALC BMI OUT NRM PARAM NOF/U: HCPCS | Performed by: PHYSICIAN ASSISTANT

## 2024-09-04 RX ORDER — CLINDAMYCIN HCL 300 MG
300 CAPSULE ORAL 3 TIMES DAILY
Qty: 30 CAPSULE | Refills: 0 | Status: SHIPPED | OUTPATIENT
Start: 2024-09-04 | End: 2024-09-14

## 2024-09-04 RX ORDER — ECHINACEA PURPUREA EXTRACT 125 MG
2 TABLET ORAL 2 TIMES DAILY
Qty: 1 EACH | Refills: 3 | Status: SHIPPED | OUTPATIENT
Start: 2024-09-04

## 2024-09-04 RX ORDER — PREDNISONE 20 MG/1
TABLET ORAL
Qty: 20 TABLET | Refills: 0 | Status: SHIPPED | OUTPATIENT
Start: 2024-09-04

## 2024-09-04 ASSESSMENT — ENCOUNTER SYMPTOMS
VOICE CHANGE: 0
SINUS PAIN: 0
EYE PAIN: 0
FACIAL SWELLING: 0
TROUBLE SWALLOWING: 0
RHINORRHEA: 0
EYE DISCHARGE: 0
SINUS PRESSURE: 0
SORE THROAT: 0

## 2024-09-04 NOTE — PROGRESS NOTES
tabs po q d x 6 days, decrease to 1 tab po q d x 4 days, then decrease to half tablet po q d x 2 days.     Dispense:  20 tablet     Refill:  0    clindamycin (CLEOCIN) 300 MG capsule     Sig: Take 1 capsule by mouth 3 times daily for 10 days     Dispense:  30 capsule     Refill:  0    sodium chloride (OCEAN NASAL SPRAY) 0.65 % nasal spray     Si sprays by Nasal route in the morning and 2 sprays in the evening.     Dispense:  1 each     Refill:  3       Electronically signed by RICHA SAMPSON PA-C on 24 at 12:25 PM CDT        Please note that this chart was generated using dragon dictation software.  Although every effort was made to ensure the accuracy of this automated transcription, some errors in transcription may have occurred.

## 2024-09-04 NOTE — ASSESSMENT & PLAN NOTE
Eustachian tube dysfunction of the left ear with evidence of a partial effusion with retracted right TM.  Plan: I will place the patient on prednisone, clindamycin, and Ocean nasal spray.  Patient is to follow-up in 2 weeks for reevaluation.

## 2024-09-24 ENCOUNTER — OFFICE VISIT (OUTPATIENT)
Dept: ENT CLINIC | Age: 44
End: 2024-09-24
Payer: MEDICAID

## 2024-09-24 VITALS
BODY MASS INDEX: 27.55 KG/M2 | HEIGHT: 69 IN | SYSTOLIC BLOOD PRESSURE: 126 MMHG | DIASTOLIC BLOOD PRESSURE: 78 MMHG | RESPIRATION RATE: 18 BRPM | WEIGHT: 186 LBS

## 2024-09-24 DIAGNOSIS — H69.92 EUSTACHIAN TUBE DYSFUNCTION, LEFT: Primary | ICD-10-CM

## 2024-09-24 PROCEDURE — G8419 CALC BMI OUT NRM PARAM NOF/U: HCPCS | Performed by: PHYSICIAN ASSISTANT

## 2024-09-24 PROCEDURE — 99213 OFFICE O/P EST LOW 20 MIN: CPT | Performed by: PHYSICIAN ASSISTANT

## 2024-09-24 PROCEDURE — G8427 DOCREV CUR MEDS BY ELIG CLIN: HCPCS | Performed by: PHYSICIAN ASSISTANT

## 2024-09-24 PROCEDURE — 1036F TOBACCO NON-USER: CPT | Performed by: PHYSICIAN ASSISTANT

## 2024-09-24 RX ORDER — PSEUDOEPHEDRINE HCL 30 MG
30 TABLET ORAL 3 TIMES DAILY
Qty: 30 TABLET | Refills: 3 | Status: SHIPPED | OUTPATIENT
Start: 2024-09-24 | End: 2025-09-24

## 2024-09-24 RX ORDER — PREDNISONE 20 MG/1
TABLET ORAL
Qty: 20 TABLET | Refills: 0 | Status: SHIPPED | OUTPATIENT
Start: 2024-09-24

## 2024-10-08 ENCOUNTER — OFFICE VISIT (OUTPATIENT)
Dept: ENT CLINIC | Age: 44
End: 2024-10-08
Payer: MEDICAID

## 2024-10-08 VITALS
DIASTOLIC BLOOD PRESSURE: 62 MMHG | BODY MASS INDEX: 28.14 KG/M2 | HEIGHT: 69 IN | WEIGHT: 190 LBS | SYSTOLIC BLOOD PRESSURE: 100 MMHG

## 2024-10-08 DIAGNOSIS — H69.92 EUSTACHIAN TUBE DYSFUNCTION, LEFT: Primary | ICD-10-CM

## 2024-10-08 DIAGNOSIS — H69.92 NEGATIVE MIDDLE EAR PRESSURE OF LEFT EAR: ICD-10-CM

## 2024-10-08 PROCEDURE — G8484 FLU IMMUNIZE NO ADMIN: HCPCS | Performed by: PHYSICIAN ASSISTANT

## 2024-10-08 PROCEDURE — G8419 CALC BMI OUT NRM PARAM NOF/U: HCPCS | Performed by: PHYSICIAN ASSISTANT

## 2024-10-08 PROCEDURE — 1036F TOBACCO NON-USER: CPT | Performed by: PHYSICIAN ASSISTANT

## 2024-10-08 PROCEDURE — 99213 OFFICE O/P EST LOW 20 MIN: CPT | Performed by: PHYSICIAN ASSISTANT

## 2024-10-08 PROCEDURE — G8427 DOCREV CUR MEDS BY ELIG CLIN: HCPCS | Performed by: PHYSICIAN ASSISTANT

## 2024-10-08 NOTE — PROGRESS NOTES
Marie is a pleasant 44-year-old  female that presents for a 2-week follow-up due to eustachian tube dysfunction of the left ear with a secondary middle ear effusion.  Patient reports that the ear is better but is not back to normal.  She denies any substantial drainage or any issues with dizziness.      Physical examination with the microscope revealed a normal TM canal to the right side.  Left side demonstrated no evidence of a middle ear effusion with a retracted TM.  The canal was noted to be normal.  Neck exam demonstrated no lymphadenopathy or thyromegaly.  Oral exam was unrevealing.      Impression: Resolved left middle ear effusion now with negative middle ear pressure       Plan: I advised the patient to continue her Sudafed 3 times a day and to use Flonase nasal spray twice a day until the ear is back to normal.  Due to the patient being almost back to baseline, she is to follow-up with me as needed.  She was reminded to call if she has any questions or concerns.      Electronically signed by RICHA SAMPSON PA-C on 10/8/24 at 11:18 AM SOTERO

## 2024-10-18 ENCOUNTER — HOSPITAL ENCOUNTER (OUTPATIENT)
Dept: WOMENS IMAGING | Age: 44
Discharge: HOME | End: 2024-10-18
Payer: MEDICAID

## 2024-10-18 DIAGNOSIS — Z12.31 VISIT FOR SCREENING MAMMOGRAM: ICD-10-CM

## 2024-10-18 PROCEDURE — 77063 BREAST TOMOSYNTHESIS BI: CPT

## 2024-11-04 ENCOUNTER — TELEPHONE (OUTPATIENT)
Dept: OTHER | Age: 44
End: 2024-11-04

## 2024-11-04 NOTE — TELEPHONE ENCOUNTER
Reached out to patient to discuss positive [employer] results. She stated that she had already spoken to the genetic counselor from [employer]. I told her that when her sons are ready for family variant testing, to call me and I'll place an order. She voiced understanding.

## 2025-05-18 ENCOUNTER — HOSPITAL ENCOUNTER (EMERGENCY)
Facility: HOSPITAL | Age: 45
Discharge: HOME | End: 2025-05-18
Attending: EMERGENCY MEDICINE | Admitting: EMERGENCY MEDICINE
Payer: COMMERCIAL

## 2025-05-18 ENCOUNTER — APPOINTMENT (OUTPATIENT)
Dept: GENERAL RADIOLOGY | Facility: HOSPITAL | Age: 45
End: 2025-05-18
Payer: COMMERCIAL

## 2025-05-18 ENCOUNTER — APPOINTMENT (OUTPATIENT)
Dept: CT IMAGING | Facility: HOSPITAL | Age: 45
End: 2025-05-18
Payer: COMMERCIAL

## 2025-05-18 VITALS
WEIGHT: 187.5 LBS | HEIGHT: 69 IN | TEMPERATURE: 98.2 F | DIASTOLIC BLOOD PRESSURE: 85 MMHG | SYSTOLIC BLOOD PRESSURE: 125 MMHG | RESPIRATION RATE: 18 BRPM | OXYGEN SATURATION: 99 % | HEART RATE: 68 BPM | BODY MASS INDEX: 27.77 KG/M2
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
## 2025-05-18 DIAGNOSIS — S10.86XA TICK BITE OF OTHER PART OF NECK, INITIAL ENCOUNTER: ICD-10-CM

## 2025-05-18 DIAGNOSIS — B34.9 VIRAL ILLNESS: Primary | ICD-10-CM

## 2025-05-18 DIAGNOSIS — N25.89 RTA (RENAL TUBULAR ACIDOSIS): ICD-10-CM

## 2025-05-18 DIAGNOSIS — B34.8 RHINOVIRUS INFECTION: ICD-10-CM

## 2025-05-18 LAB
ALBUMIN SERPL-MCNC: 4 G/DL (ref 3.5–5.2)
ALBUMIN/GLOB SERPL: 1.5 G/DL
ALP SERPL-CCNC: 48 U/L (ref 39–117)
ALT SERPL W P-5'-P-CCNC: 8 U/L (ref 1–33)
AMPHET+METHAMPHET UR QL: NEGATIVE
AMPHETAMINES UR QL: NEGATIVE
ANION GAP SERPL CALCULATED.3IONS-SCNC: 11 MMOL/L (ref 5–15)
AST SERPL-CCNC: 10 U/L (ref 1–32)
B PARAPERT DNA SPEC QL NAA+PROBE: NOT DETECTED
B PERT DNA SPEC QL NAA+PROBE: NOT DETECTED
BARBITURATES UR QL SCN: NEGATIVE
BASOPHILS # BLD AUTO: 0.05 10*3/MM3 (ref 0–0.2)
BASOPHILS NFR BLD AUTO: 0.5 % (ref 0–1.5)
BENZODIAZ UR QL SCN: NEGATIVE
BILIRUB SERPL-MCNC: 0.2 MG/DL (ref 0–1.2)
BUN SERPL-MCNC: 9 MG/DL (ref 6–20)
BUN/CREAT SERPL: 14.5 (ref 7–25)
BUPRENORPHINE SERPL-MCNC: NEGATIVE NG/ML
C PNEUM DNA NPH QL NAA+NON-PROBE: NOT DETECTED
CALCIUM SPEC-SCNC: 8.5 MG/DL (ref 8.6–10.5)
CANNABINOIDS SERPL QL: POSITIVE
CHLORIDE SERPL-SCNC: 106 MMOL/L (ref 98–107)
CO2 SERPL-SCNC: 20 MMOL/L (ref 22–29)
COCAINE UR QL: NEGATIVE
CREAT SERPL-MCNC: 0.62 MG/DL (ref 0.57–1)
CRP SERPL-MCNC: 2.36 MG/DL (ref 0–0.5)
D DIMER PPP FEU-MCNC: 0.64 MCGFEU/ML (ref 0–0.5)
DEPRECATED RDW RBC AUTO: 40 FL (ref 37–54)
EGFRCR SERPLBLD CKD-EPI 2021: 111.4 ML/MIN/1.73
EOSINOPHIL # BLD AUTO: 0.22 10*3/MM3 (ref 0–0.4)
EOSINOPHIL NFR BLD AUTO: 2.2 % (ref 0.3–6.2)
ERYTHROCYTE [DISTWIDTH] IN BLOOD BY AUTOMATED COUNT: 12.4 % (ref 12.3–15.4)
FENTANYL UR-MCNC: NEGATIVE NG/ML
FLUAV SUBTYP SPEC NAA+PROBE: NOT DETECTED
FLUBV RNA ISLT QL NAA+PROBE: NOT DETECTED
GLOBULIN UR ELPH-MCNC: 2.7 GM/DL
GLUCOSE SERPL-MCNC: 83 MG/DL (ref 65–99)
HADV DNA SPEC NAA+PROBE: NOT DETECTED
HCG INTACT+B SERPL-ACNC: <0.1 MIU/ML
HCOV 229E RNA SPEC QL NAA+PROBE: NOT DETECTED
HCOV HKU1 RNA SPEC QL NAA+PROBE: NOT DETECTED
HCOV NL63 RNA SPEC QL NAA+PROBE: NOT DETECTED
HCOV OC43 RNA SPEC QL NAA+PROBE: NOT DETECTED
HCT VFR BLD AUTO: 41.9 % (ref 34–46.6)
HGB BLD-MCNC: 13.9 G/DL (ref 12–15.9)
HMPV RNA NPH QL NAA+NON-PROBE: NOT DETECTED
HPIV1 RNA ISLT QL NAA+PROBE: NOT DETECTED
HPIV2 RNA SPEC QL NAA+PROBE: NOT DETECTED
HPIV3 RNA NPH QL NAA+PROBE: NOT DETECTED
HPIV4 P GENE NPH QL NAA+PROBE: NOT DETECTED
IMM GRANULOCYTES # BLD AUTO: 0.03 10*3/MM3 (ref 0–0.05)
IMM GRANULOCYTES NFR BLD AUTO: 0.3 % (ref 0–0.5)
LYMPHOCYTES # BLD AUTO: 1.94 10*3/MM3 (ref 0.7–3.1)
LYMPHOCYTES NFR BLD AUTO: 19.2 % (ref 19.6–45.3)
M PNEUMO IGG SER IA-ACNC: NOT DETECTED
MCH RBC QN AUTO: 29.1 PG (ref 26.6–33)
MCHC RBC AUTO-ENTMCNC: 33.2 G/DL (ref 31.5–35.7)
MCV RBC AUTO: 87.7 FL (ref 79–97)
METHADONE UR QL SCN: NEGATIVE
MONOCYTES # BLD AUTO: 0.98 10*3/MM3 (ref 0.1–0.9)
MONOCYTES NFR BLD AUTO: 9.7 % (ref 5–12)
NEUTROPHILS NFR BLD AUTO: 6.9 10*3/MM3 (ref 1.7–7)
NEUTROPHILS NFR BLD AUTO: 68.1 % (ref 42.7–76)
NRBC BLD AUTO-RTO: 0 /100 WBC (ref 0–0.2)
NT-PROBNP SERPL-MCNC: 151.7 PG/ML (ref 0–450)
OPIATES UR QL: NEGATIVE
OXYCODONE UR QL SCN: NEGATIVE
PCP UR QL SCN: NEGATIVE
PLATELET # BLD AUTO: 253 10*3/MM3 (ref 140–450)
PMV BLD AUTO: 10.8 FL (ref 6–12)
POTASSIUM SERPL-SCNC: 3.8 MMOL/L (ref 3.5–5.2)
PROCALCITONIN SERPL-MCNC: <0.02 NG/ML (ref 0–0.25)
PROT SERPL-MCNC: 6.7 G/DL (ref 6–8.5)
RBC # BLD AUTO: 4.78 10*6/MM3 (ref 3.77–5.28)
RHINOVIRUS RNA SPEC NAA+PROBE: DETECTED
RSV RNA NPH QL NAA+NON-PROBE: NOT DETECTED
SARS-COV-2 RNA RESP QL NAA+PROBE: NOT DETECTED
SODIUM SERPL-SCNC: 137 MMOL/L (ref 136–145)
TRICYCLICS UR QL SCN: NEGATIVE
WBC NRBC COR # BLD AUTO: 10.12 10*3/MM3 (ref 3.4–10.8)

## 2025-05-18 PROCEDURE — 84702 CHORIONIC GONADOTROPIN TEST: CPT | Performed by: EMERGENCY MEDICINE

## 2025-05-18 PROCEDURE — 71045 X-RAY EXAM CHEST 1 VIEW: CPT

## 2025-05-18 PROCEDURE — 86140 C-REACTIVE PROTEIN: CPT | Performed by: EMERGENCY MEDICINE

## 2025-05-18 PROCEDURE — 85025 COMPLETE CBC W/AUTO DIFF WBC: CPT | Performed by: EMERGENCY MEDICINE

## 2025-05-18 PROCEDURE — 83880 ASSAY OF NATRIURETIC PEPTIDE: CPT | Performed by: EMERGENCY MEDICINE

## 2025-05-18 PROCEDURE — 0202U NFCT DS 22 TRGT SARS-COV-2: CPT | Performed by: EMERGENCY MEDICINE

## 2025-05-18 PROCEDURE — 80307 DRUG TEST PRSMV CHEM ANLYZR: CPT | Performed by: EMERGENCY MEDICINE

## 2025-05-18 PROCEDURE — 25510000001 IOPAMIDOL PER 1 ML: Performed by: EMERGENCY MEDICINE

## 2025-05-18 PROCEDURE — 99285 EMERGENCY DEPT VISIT HI MDM: CPT | Performed by: EMERGENCY MEDICINE

## 2025-05-18 PROCEDURE — 85379 FIBRIN DEGRADATION QUANT: CPT | Performed by: EMERGENCY MEDICINE

## 2025-05-18 PROCEDURE — 71275 CT ANGIOGRAPHY CHEST: CPT

## 2025-05-18 PROCEDURE — 93005 ELECTROCARDIOGRAM TRACING: CPT | Performed by: EMERGENCY MEDICINE

## 2025-05-18 PROCEDURE — 84145 PROCALCITONIN (PCT): CPT | Performed by: EMERGENCY MEDICINE

## 2025-05-18 PROCEDURE — 80053 COMPREHEN METABOLIC PANEL: CPT | Performed by: EMERGENCY MEDICINE

## 2025-05-18 RX ORDER — IOPAMIDOL 755 MG/ML
100 INJECTION, SOLUTION INTRAVASCULAR
Status: COMPLETED | OUTPATIENT
Start: 2025-05-18 | End: 2025-05-18

## 2025-05-18 RX ORDER — PREDNISONE 20 MG/1
20 TABLET ORAL 2 TIMES DAILY
Qty: 10 TABLET | Refills: 0 | Status: SHIPPED | OUTPATIENT
Start: 2025-05-18 | End: 2025-05-23

## 2025-05-18 RX ORDER — DOXYCYCLINE 100 MG/1
100 CAPSULE ORAL 2 TIMES DAILY
Qty: 2 CAPSULE | Refills: 0 | Status: SHIPPED | OUTPATIENT
Start: 2025-05-18 | End: 2025-05-19

## 2025-05-18 RX ORDER — ALBUTEROL SULFATE 90 UG/1
2 INHALANT RESPIRATORY (INHALATION) EVERY 4 HOURS PRN
Qty: 1 G | Refills: 0 | Status: SHIPPED | OUTPATIENT
Start: 2025-05-18

## 2025-05-18 RX ADMIN — IOPAMIDOL 100 ML: 755 INJECTION, SOLUTION INTRAVENOUS at 16:54

## 2025-05-18 NOTE — DISCHARGE INSTRUCTIONS
It was very nice to meet you, Aaliyah. Thank you for allowing us to take care of you today at [facility].     Today you were seen in the emergency department for your symptoms. Please understand that an ER evaluation is just the start of your evaluation. We do the best we can, but we are often unable to fully find what is causing your symptoms from one evaluation.  Because of this, the goal is to determine whether you need to be evaluated in the hospital or if it is safe for you to go home and see other doctors provided such as primary care physicians or specialist on an outpatient basis.      Like we discussed, I strongly urge that you follow up with your primary care doctor. Please call their office to set up an appointment as soon as possible so that you can be re-evaluated for improvement in your symptoms or for any other questions.  I have provided the information needed, including phone number, to call to set up an appointment below in these discharge papers.      Educational material has also been provided in the following pages regarding what we have discussed today.  I have called in a prescription for tick prophylaxis steroids and neb treatments for your cough and shortness of breath caused by rhinovirus.     Please return to the emergency room within 12-48 hours if you experience symptoms such as the following:   Fever, chills, chest pain or shortness of breath, pain with inspiration/expiration, pain that travels to your arms, neck or back, nausea, vomiting, severe headache, tearing pain in your chest, dizziness, feel as though you are about to pass out, OR if you have any worsening symptoms, or any other concerns.

## 2025-05-18 NOTE — ED PROVIDER NOTES
Subjective   History of Present Illness  Patient came to the ER not feeling well she has some shortness of breath and runny feeling unwell with some cough and congestion.  She also has recent tick bite had nasal congestion and generalized bodyaches.  There is no chest pain no syncope    Shortness of Breath  Severity:  Moderate  Onset quality:  Gradual  Timing:  Intermittent  Progression:  Worsening  Chronicity:  New  Context: not activity, not animal exposure, not emotional upset, not occupational exposure, not pollens, not strong odors and not URI    Relieved by:  Nothing  Worsened by:  Nothing  Ineffective treatments:  None tried  Associated symptoms: cough    Associated symptoms: no chest pain, no claudication, no diaphoresis, no headaches, no hemoptysis, no neck pain, no PND, no sore throat, no sputum production, no swollen glands, no vomiting and no wheezing    Risk factors: no recent alcohol use, no family hx of DVT and no obesity        Review of Systems   Constitutional: Negative.  Negative for diaphoresis.   HENT: Negative.  Negative for sore throat.    Eyes: Negative.    Respiratory:  Positive for cough and shortness of breath. Negative for hemoptysis, sputum production and wheezing.    Cardiovascular: Negative.  Negative for chest pain, claudication and PND.   Gastrointestinal: Negative.  Negative for vomiting.   Musculoskeletal: Negative.  Negative for back pain and neck pain.   Skin: Negative.    Neurological: Negative.  Negative for headaches.   All other systems reviewed and are negative.      Past Medical History:   Diagnosis Date    Asthma     Dysplasia of cervix     Gallbladder abscess     History of bronchitis     HPV in female     Smoker     Vulvar lesion        No Known Allergies    Past Surgical History:   Procedure Laterality Date    CERVICAL CONIZATION N/A 5/30/2018    Procedure: CERVICAL CONIZATION;  Surgeon: Bautista Ross MD;  [facility]: [facility];  Service: Obstetrics/Gynecology     CHOLECYSTECTOMY      LEEP N/A 5/30/2018    Procedure: LOOP ELECTROCAUTERY EXCISION PROCEDURE;  Surgeon: Bautista Ross MD;  Location:  [facility];  Service: Obstetrics/Gynecology    VULVA BIOPSY N/A 5/30/2018    Procedure: Excision vulvar lesion;  Surgeon: Bautista Ross MD;  Location:  [facility];  Service: Obstetrics/Gynecology       Family History   Problem Relation Age of Onset    Cancer Mother     Heart disease Mother     Diabetes Mother     Stroke Mother     Obesity Mother     Cancer Father     Heart disease Father     Diabetes Father     Stroke Father     Lung disease Father     Diabetes Sister     Cancer Maternal Grandmother     Heart disease Maternal Grandmother     Diabetes Maternal Grandmother     Lung disease Maternal Grandmother     Stroke Maternal Grandmother     Obesity Maternal Grandmother     Ovarian cancer Maternal Grandmother     Cancer Maternal Grandfather     Heart disease Maternal Grandfather     Diabetes Maternal Grandfather     Lung disease Maternal Grandfather     Stroke Maternal Grandfather     Obesity Maternal Grandfather     Cancer Paternal Grandmother     Heart disease Paternal Grandmother     Diabetes Paternal Grandmother     Lung disease Paternal Grandmother     Stroke Paternal Grandmother     Obesity Paternal Grandmother     Cancer Paternal Grandfather     Heart disease Paternal Grandfather     Diabetes Paternal Grandfather     Lung disease Paternal Grandfather     Stroke Paternal Grandfather     Obesity Paternal Grandfather     Colon cancer Paternal Grandfather     Breast cancer Neg Hx     Uterine cancer Neg Hx     Melanoma Neg Hx        Social History     Socioeconomic History    Marital status: Single   Tobacco Use    Smoking status: Every Day     Current packs/day: 0.00     Types: Cigarettes     Last attempt to quit: 11/15/2014     Years since quitting: 10.5    Smokeless tobacco: Never    Tobacco comments:     SMOKES 5 CIGARETTE PER DAY   Substance and Sexual Activity     Alcohol use: No    Drug use: No    Sexual activity: Defer           Objective   Physical Exam  Vitals and nursing note reviewed. Exam conducted with a chaperone present.   Constitutional:       General: She is not in acute distress.     Appearance: Normal appearance. She is well-developed. She is not toxic-appearing or diaphoretic.   HENT:      Head: Normocephalic and atraumatic.      Right Ear: External ear normal.      Nose: Nose normal.      Mouth/Throat:      Mouth: Mucous membranes are moist.   Eyes:      Conjunctiva/sclera: Conjunctivae normal.      Pupils: Pupils are equal, round, and reactive to light.   Neck:      Comments: Small insect bite on the lateral left aspect of the neck which is erythematous.  There is no body parts remaining.  Patient states it was a take that bit her yesterday and took it off.  Take was not on for a long time.  Cardiovascular:      Rate and Rhythm: Normal rate and regular rhythm.      Chest Wall: PMI is not displaced.      Pulses: Normal pulses. No decreased pulses.      Heart sounds: Normal heart sounds. No murmur heard.  Pulmonary:      Effort: Pulmonary effort is normal. No tachypnea, accessory muscle usage or respiratory distress.      Breath sounds: No stridor. Examination of the right-lower field reveals wheezing. Examination of the left-lower field reveals wheezing. Wheezing present. No decreased breath sounds, rhonchi or rales.   Chest:      Chest wall: No tenderness or crepitus.   Abdominal:      General: Bowel sounds are normal. There is no distension.      Palpations: Abdomen is soft.      Tenderness: There is no abdominal tenderness.   Musculoskeletal:         General: No swelling or tenderness. Normal range of motion.      Cervical back: Normal range of motion and neck supple. No rigidity.      Right lower leg: No tenderness. No edema.      Left lower leg: No tenderness. No edema.      Comments: Lower extremity exam bilaterally is unremarkable.  There is no right or  left calf tenderness .  There is no palpable venous cord.  No obvious difference in the size of the legs.  No pitting edema.  The dorsalis pedis and posterior tibial femoral and popliteal pulses are palpable and +2 bilaterally.  Homans sign is negative   Skin:     General: Skin is warm.      Capillary Refill: Capillary refill takes less than 2 seconds.      Coloration: Skin is not jaundiced.      Findings: No erythema or rash.      Comments: Target lesions no cellulitis   Neurological:      General: No focal deficit present.      Mental Status: She is alert and oriented to person, place, and time. Mental status is at baseline.      Cranial Nerves: No cranial nerve deficit.      Motor: No weakness.      Coordination: Coordination normal.      Deep Tendon Reflexes: Reflexes are normal and symmetric. Reflexes normal.   Psychiatric:         Mood and Affect: Mood normal.         Procedures           ED Course  ED Course as of 05/18/25 1741   Sun May 18, 2025   1555 Normal sinus rhythm with T wave inversion in aVR with sinus arrhythmia [TS]   1736 This patient came to the ED not feeling well and generalized malaise and fatigue.  Along with some shortness of breath.  Since her dimer was elevated and her well score will be 3 a CT of the chest obtained which does not show a PE but shows some patchy haziness but no acute infiltrate.  Her CRP is slightly elevated marijuana screen is positive she has got a RTA and rhinovirus positive. [TS]   1737 Patient also had complaints of a tick bite on her neck which was less than 24 hours ago she is not sure how long the tick was on her but she also had a tick bite couple days ago there is no erythema migrans or rash or joint abnormalities there is no liver enzyme abnormalities and there is no thrombocytopenia associate with this but she was concerned about tickborne diseases.  Shared decision making to [place] the patient regarding use of prophylaxis for tickborne disease i.e. use of  doxycycline the patient wants to use the doxycycline. [TS]      ED Course User Index  [TS] Raul Bueno MD                                                       Medical Decision Making  Differential Diagnosis:  I considered pulmonary etiology, asthma, chronic obstructive pulmonary disease, pneumonia, pulmonary embolism, adult respiratory distress syndrome, pneumothorax, pleural effusion, pulmonary fibrosis, cardiac etiology, congestive heart failure, myocardial infarction, metabolic etiology, diabetic ketoacidosis, uremia, acidosis, sepsis, anemia, drug related etiology, hyperventilation and CNS disease as a possible cause of dyspnea in this patient. This is a partial list of diagnoses considered.           Problems Addressed:  Rhinovirus infection: acute illness or injury  RTA (renal tubular acidosis): undiagnosed new problem with uncertain prognosis  Tick bite of other part of neck, initial encounter: acute illness or injury  Viral illness: acute illness or injury    Amount and/or Complexity of Data Reviewed  Labs: ordered.     Details: Labs reviewed  Radiology: ordered.     Details: Scans were reviewed  ECG/medicine tests: ordered.    Risk  Prescription drug management.  Risk Details: This patient presents with shortness of breath patient arrived hemodynamically stable was placed on the monitor and IV access obtained EKG was obtained and did not reveal any malignant/unstable dysrhythmias any acute ST elevation, no evidence of Brugada, or significantly prolonged QT .  Presentation not consistent with other acute, emergent cause of shortness of breath at this time.  Low/moderate suspicion for acute PE is low/moderate risk per Wells and Years criteria and no evidence of DVT such as calf swelling, tenderness, palpable tortuous lower extremity vein, or Homans' sign.  CT chest were performed which were negative for PE low suspicion for pneumothorax as the patient is saturating well and has no radiographic evidence of  a pneumothorax.  Low suspicion for dissection there is no widened mediastinum, hypotension, pulses deficit, and no tearing back/abdominal pain.  Low suspicion for tamponade as there is no JVD, muffled heart sounds, electrical alternans on EKG, and no hypotension.  Low suspicion for pericarditis as there is no diffuse ST elevation or MD depression and the patient is afebrile.  No evidence of GI bleed per patient's history.  Low suspicion for CHF exacerbation as there is no evidence of volume overload and no evidence of severely elevated BNP.  Low suspicion for pneumonia since the patient has no fever no productive cough no chest x-ray findings of pneumonia and no leukocytosis.        Final diagnoses:   Viral illness   Rhinovirus infection   Tick bite of other part of neck, initial encounter   RTA (renal tubular acidosis)       ED Disposition  ED Disposition       ED Disposition   Discharge    Condition   Stable    Comment   --               Josiah Chilel Jr., MD  [address] [facility] [id number] [phone]    In 2 days           Medication List        New Prescriptions      albuterol sulfate  (90 Base) MCG/ACT inhaler  Commonly known as: PROVENTIL HFA;VENTOLIN HFA;PROAIR HFA  Inhale 2 puffs Every 4 (Four) Hours As Needed for Wheezing.     doxycycline 100 MG capsule  Commonly known as: MONODOX  Take 1 capsule by mouth 2 (Two) Times a Day for 1 day.     predniSONE 20 MG tablet  Commonly known as: DELTASONE  Take 1 tablet by mouth 2 (Two) Times a Day for 5 days.               Where to Get Your Medications        These medications were sent to [facility] [address] [place] [address] [id number] [phone] [facility] [phone]   [address] [facility] [place] [address]      Phone: [phone]   albuterol sulfate  (90 Base) MCG/ACT inhaler  doxycycline 100 MG capsule  predniSONE 20 MG tablet            Raul Bueno MD  05/18/25 9198       Raul Bueno MD  05/18/25 6072

## 2025-05-19 LAB
QT INTERVAL: 402 MS
QTC INTERVAL: 391 MS

## (undated) DEVICE — INTENDED FOR TISSUE SEPARATION, AND OTHER PROCEDURES THAT REQUIRE A SHARP SURGICAL BLADE TO PUNCTURE OR CUT.: Brand: BARD-PARKER ® STAINLESS STEEL BLADES

## (undated) DEVICE — SYR CONTRL LUERLOK 10CC

## (undated) DEVICE — PAD MAJOR LITHOTOMY: Brand: MEDLINE INDUSTRIES, INC.

## (undated) DEVICE — SUTURE STRATAFIX SPRL MCRYL + SZ 4-0 L12IN ABSRB UD PS-2 SXMP1B117

## (undated) DEVICE — GLV SURG TRIUMPH ORTHO W/ALOE PF LTX 8 STRL

## (undated) DEVICE — PK TURNOVER RM ADV

## (undated) DEVICE — IMMOBILIZER SLING: Brand: DEROYAL

## (undated) DEVICE — GOWN,NON-REINFORCED,SIRUS,SET IN SLV,XXL: Brand: MEDLINE

## (undated) DEVICE — Z DISCONTINUED USE 2423037 APPLICATOR MEDICATED 3 CC CHLORHEXIDINE GLUC 2% CHLORAPREP

## (undated) DEVICE — PAD GRND REM POLYHESIVE A/ DISP

## (undated) DEVICE — SUTURE VCRL SZ 3-0 L27IN ABSRB UD L26MM SH 1/2 CIR J416H

## (undated) DEVICE — MINOR CDS: Brand: MEDLINE INDUSTRIES, INC.

## (undated) DEVICE — DECANTER: Brand: UNBRANDED

## (undated) DEVICE — PROVE COVER: Brand: UNBRANDED

## (undated) DEVICE — HYPODERMIC SAFETY NEEDLE: Brand: MAGELLAN

## (undated) DEVICE — ADHESIVE SKIN CLSR 0.7ML TOP DERMBND ADV

## (undated) DEVICE — TRY PREP SCRB VAG PVP

## (undated) DEVICE — SUTURE PERMAHAND SZ 2-0 L18IN NONABSORBABLE BLK L26MM FS 685G

## (undated) DEVICE — PACK,UNIVERSAL,NO GOWNS: Brand: MEDLINE

## (undated) DEVICE — GLOVE SURG SZ 7 CRM LTX FREE POLYISOPRENE POLYMER BEAD ANTI

## (undated) DEVICE — TUBING, SUCTION, 1/4" X 12', STRAIGHT: Brand: MEDLINE

## (undated) DEVICE — ANTIBACTERIAL UNDYED BRAIDED (POLYGLACTIN 910), SYNTHETIC ABSORBABLE SUTURE: Brand: COATED VICRYL

## (undated) DEVICE — UTILITY MARKER W/MED LABELS: Brand: MEDLINE

## (undated) DEVICE — PAD,PREPPING,CUFFED,24X48,7",NONSTERILE: Brand: MEDLINE

## (undated) DEVICE — Device

## (undated) DEVICE — MAJOR DOUBLE BASIN W/GOWNS II: Brand: MEDLINE INDUSTRIES, INC.

## (undated) DEVICE — CULT ANAERB

## (undated) DEVICE — SPECIMEN ORIENTATION CHARMS, SIX DISTINCTLY SHAPED STERILE 10MM CHARMS: Brand: MARGINMAP

## (undated) DEVICE — ELECTRD BALL LLETZ 5MM 13CM STRL

## (undated) DEVICE — ELECTRD NDL EDGE/STD 2.84IN

## (undated) DEVICE — GAUZE,SPONGE,4"X4",8PLY,STRL,LF,10/TRAY: Brand: MEDLINE

## (undated) DEVICE — SWAB CULT AERO TWIN MD

## (undated) DEVICE — STERILE RAYON TIPPED OB/GYN APPLICATORS: Brand: PURITAN

## (undated) DEVICE — DRSNG TELFA PAD NONADH STR 1S 3X8IN

## (undated) DEVICE — SYRINGE MED 10ML LUERLOCK TIP W/O SFTY DISP

## (undated) DEVICE — NDL HYPO PRECISIONGLIDE REG 25G 1 1/2

## (undated) DEVICE — APPLIER CLP L9.375IN APER 2.1MM CLS L3.8MM 20 SM TI CLP